# Patient Record
Sex: MALE | Race: WHITE | NOT HISPANIC OR LATINO | URBAN - METROPOLITAN AREA
[De-identification: names, ages, dates, MRNs, and addresses within clinical notes are randomized per-mention and may not be internally consistent; named-entity substitution may affect disease eponyms.]

---

## 2017-11-20 ENCOUNTER — APPOINTMENT (RX ONLY)
Dept: URBAN - METROPOLITAN AREA OTHER 10 | Facility: OTHER | Age: 72
Setting detail: DERMATOLOGY
End: 2017-11-20

## 2017-11-20 DIAGNOSIS — L95.8 OTHER VASCULITIS LIMITED TO THE SKIN: ICD-10-CM

## 2017-11-20 PROBLEM — I10 ESSENTIAL (PRIMARY) HYPERTENSION: Status: ACTIVE | Noted: 2017-11-20

## 2017-11-20 PROBLEM — L30.9 DERMATITIS, UNSPECIFIED: Status: ACTIVE | Noted: 2017-11-20

## 2017-11-20 PROBLEM — F31.9 BIPOLAR DISORDER, UNSPECIFIED: Status: ACTIVE | Noted: 2017-11-20

## 2017-11-20 PROCEDURE — 11100: CPT

## 2017-11-20 PROCEDURE — ? BIOPSY BY PUNCH METHOD

## 2017-11-20 ASSESSMENT — LOCATION ZONE DERM: LOCATION ZONE: ARM

## 2017-11-20 ASSESSMENT — LOCATION DETAILED DESCRIPTION DERM: LOCATION DETAILED: LEFT VENTRAL MEDIAL DISTAL FOREARM

## 2017-11-20 ASSESSMENT — LOCATION SIMPLE DESCRIPTION DERM: LOCATION SIMPLE: LEFT FOREARM

## 2017-11-20 NOTE — PROCEDURE: BIOPSY BY PUNCH METHOD
X Depth Of Punch In Cm (Optional): 0
Biopsy Type: H and E
Lab Facility: [id number]
Punch Size In Mm: 4
Notification Instructions: Patient will be notified of biopsy results. However, patient instructed to call the office if not contacted within 2 weeks.
Body Location Override (Optional - Billing Will Still Be Based On Selected Body Map Location If Applicable): left forearm
Anesthesia Type: 1% lidocaine with 1:200,000 epinephrine
Patient Will Remove Sutures At Home?: No
Home Suture Removal Text: Patient was provided a home suture removal kit and will remove their sutures at home.  If they have any questions or difficulties they will call the office.
Wound Care: Polysporin ointment
Detail Level: Detailed
Post-Care Instructions: I reviewed with the patient in detail post-care instructions. Patient is to keep the biopsy site dry overnight, and then apply bacitracin twice daily until healed. Patient may apply hydrogen peroxide soaks to remove any crusting.
Suture Removal: 14 days
Lab: [id number]
Anesthesia Volume In Cc: 1
Epidermal Sutures: 5-0 Nylon
Dressing: no dressing applied
Billing Type: Third-Party Bill
Consent: Written consent was obtained and risks were reviewed including but not limited to scarring, infection, bleeding, scabbing, incomplete removal, nerve damage and allergy to anesthesia.
Hemostasis: Pressure

## 2017-12-04 ENCOUNTER — APPOINTMENT (RX ONLY)
Dept: URBAN - METROPOLITAN AREA OTHER 10 | Facility: OTHER | Age: 72
Setting detail: DERMATOLOGY
End: 2017-12-04

## 2017-12-04 DIAGNOSIS — Z48.02 ENCOUNTER FOR REMOVAL OF SUTURES: ICD-10-CM

## 2017-12-04 DIAGNOSIS — M31.0 HYPERSENSITIVITY ANGIITIS: ICD-10-CM | Status: WORSENING

## 2017-12-04 PROCEDURE — 99024 POSTOP FOLLOW-UP VISIT: CPT

## 2017-12-04 PROCEDURE — ? PRESCRIPTION

## 2017-12-04 PROCEDURE — ? SUTURE REMOVAL (GLOBAL PERIOD)

## 2017-12-04 PROCEDURE — ? OTHER

## 2017-12-04 RX ORDER — PREDNISONE 10 MG/1
TABLET ORAL DAILY
Qty: 40 | Refills: 0 | Status: ERX | COMMUNITY
Start: 2017-12-04

## 2017-12-04 RX ADMIN — PREDNISONE: 10 TABLET ORAL at 14:30

## 2017-12-04 ASSESSMENT — LOCATION DETAILED DESCRIPTION DERM
LOCATION DETAILED: LEFT DISTAL PRETIBIAL REGION
LOCATION DETAILED: RIGHT DISTAL PRETIBIAL REGION
LOCATION DETAILED: LEFT ANTERIOR PROXIMAL THIGH
LOCATION DETAILED: LEFT MEDIAL INFERIOR CHEST
LOCATION DETAILED: LEFT PROXIMAL DORSAL FOREARM
LOCATION DETAILED: LEFT MID-UPPER BACK

## 2017-12-04 ASSESSMENT — BSA RASH: BSA RASH: 36

## 2017-12-04 ASSESSMENT — SEVERITY ASSESSMENT: SEVERITY: MODERATE

## 2017-12-04 ASSESSMENT — LOCATION SIMPLE DESCRIPTION DERM
LOCATION SIMPLE: LEFT THIGH
LOCATION SIMPLE: RIGHT PRETIBIAL REGION
LOCATION SIMPLE: CHEST
LOCATION SIMPLE: LEFT UPPER BACK
LOCATION SIMPLE: LEFT PRETIBIAL REGION
LOCATION SIMPLE: LEFT FOREARM

## 2017-12-04 ASSESSMENT — LOCATION ZONE DERM
LOCATION ZONE: LEG
LOCATION ZONE: ARM
LOCATION ZONE: TRUNK

## 2017-12-04 ASSESSMENT — PAIN INTENSITY VAS: HOW INTENSE IS YOUR PAIN 0 BEING NO PAIN, 10 BEING THE MOST SEVERE PAIN POSSIBLE?: NO PAIN

## 2017-12-04 NOTE — PROCEDURE: OTHER
Other (Free Text): ANCA panel, CBC, CMP, UA w/ Refelx, G6PD, DOROTEO w/ Reflex, RF, Sed Rate.\\n\\nWill obtain labs. Pt is worsening so a prednisone taper is warranted.
Detail Level: Zone
Note Text (......Xxx Chief Complaint.): This diagnosis correlates with the

## 2017-12-04 NOTE — PROCEDURE: SUTURE REMOVAL (GLOBAL PERIOD)
Add 83528 Cpt? (Important Note: In 2017 The Use Of 25284 Is Being Tracked By Cms To Determine Future Global Period Reimbursement For Global Periods): yes
Detail Level: Detailed
Body Location Override (Optional - Billing Will Still Be Based On Selected Body Map Location If Applicable): left posterior forearm

## 2017-12-08 ENCOUNTER — RX ONLY (OUTPATIENT)
Age: 72
Setting detail: RX ONLY
End: 2017-12-08

## 2017-12-08 RX ORDER — NITROFURANTOIN MACROCRYSTALS 100 MG/1
CAPSULE ORAL
Qty: 10 | Refills: 0 | Status: ERX | COMMUNITY
Start: 2017-12-08

## 2017-12-11 ENCOUNTER — RX ONLY (OUTPATIENT)
Age: 72
Setting detail: RX ONLY
End: 2017-12-11

## 2017-12-11 RX ORDER — DOXYCYCLINE HYCLATE 100 MG/1
CAPSULE, GELATIN COATED ORAL
Qty: 20 | Refills: 0 | Status: ERX | COMMUNITY
Start: 2017-12-11

## 2018-01-04 ENCOUNTER — APPOINTMENT (RX ONLY)
Dept: URBAN - METROPOLITAN AREA OTHER 10 | Facility: OTHER | Age: 73
Setting detail: DERMATOLOGY
End: 2018-01-04

## 2018-01-04 DIAGNOSIS — M31.0 HYPERSENSITIVITY ANGIITIS: ICD-10-CM | Status: IMPROVED

## 2018-01-04 DIAGNOSIS — B35.3 TINEA PEDIS: ICD-10-CM

## 2018-01-04 PROCEDURE — ? PATIENT SPECIFIC COUNSELING

## 2018-01-04 PROCEDURE — ? ORDER TESTS

## 2018-01-04 PROCEDURE — 99213 OFFICE O/P EST LOW 20 MIN: CPT

## 2018-01-04 PROCEDURE — ? PRESCRIPTION

## 2018-01-04 PROCEDURE — ? COUNSELING

## 2018-01-04 PROCEDURE — ? TREATMENT REGIMEN

## 2018-01-04 RX ORDER — KETOCONAZOLE 20 MG/G
1 CREAM TOPICAL QD
Qty: 60 | Refills: 3 | Status: ERX | COMMUNITY
Start: 2018-01-04

## 2018-01-04 RX ADMIN — KETOCONAZOLE 1: 20 CREAM TOPICAL at 15:23

## 2018-01-04 ASSESSMENT — SEVERITY ASSESSMENT: SEVERITY: CLEAR

## 2018-01-04 ASSESSMENT — LOCATION ZONE DERM
LOCATION ZONE: FEET
LOCATION ZONE: TRUNK
LOCATION ZONE: LEG

## 2018-01-04 ASSESSMENT — LOCATION SIMPLE DESCRIPTION DERM
LOCATION SIMPLE: LEFT PRETIBIAL REGION
LOCATION SIMPLE: RIGHT FOOT
LOCATION SIMPLE: CHEST
LOCATION SIMPLE: LEFT UPPER BACK

## 2018-01-04 ASSESSMENT — LOCATION DETAILED DESCRIPTION DERM
LOCATION DETAILED: 1ST WEBSPACE RIGHT FOOT
LOCATION DETAILED: LEFT MEDIAL UPPER BACK
LOCATION DETAILED: LEFT PROXIMAL PRETIBIAL REGION
LOCATION DETAILED: STERNUM

## 2018-01-04 ASSESSMENT — PAIN INTENSITY VAS: HOW INTENSE IS YOUR PAIN 0 BEING NO PAIN, 10 BEING THE MOST SEVERE PAIN POSSIBLE?: NO PAIN

## 2018-01-04 NOTE — PROCEDURE: PATIENT SPECIFIC COUNSELING
Detail Level: Simple
UTI is clear today, no need for Dapsone at this time. Will recheck UA with reflex

## 2018-01-04 NOTE — HPI: RASH (VASCULITIS)
How Severe Is It?: moderate
Is This A New Presentation, Or A Follow-Up?: Follow Up Vasculitis
Additional History: Patient states that he is doing much better

## 2018-01-04 NOTE — PROCEDURE: TREATMENT REGIMEN
Otc Regimen: Ok to soak in epson salt and alcohol
Detail Level: Simple
Initiate Treatment: Ketoconazole cream qd x 2 weeks apply between toes

## 2019-10-12 ENCOUNTER — HOSPITAL ENCOUNTER (INPATIENT)
Dept: HOSPITAL 5 - 3A | Age: 74
LOS: 10 days | Discharge: NURSING, REHAB OR LONG-TERM CARE | DRG: 885 | End: 2019-10-22
Attending: PSYCHIATRY & NEUROLOGY | Admitting: PSYCHIATRY & NEUROLOGY
Payer: MEDICARE

## 2019-10-12 DIAGNOSIS — I48.91: ICD-10-CM

## 2019-10-12 DIAGNOSIS — E78.5: ICD-10-CM

## 2019-10-12 DIAGNOSIS — Z88.8: ICD-10-CM

## 2019-10-12 DIAGNOSIS — Z88.6: ICD-10-CM

## 2019-10-12 DIAGNOSIS — G30.8: ICD-10-CM

## 2019-10-12 DIAGNOSIS — F02.81: ICD-10-CM

## 2019-10-12 DIAGNOSIS — F25.0: Primary | ICD-10-CM

## 2019-10-12 PROCEDURE — 93005 ELECTROCARDIOGRAM TRACING: CPT

## 2019-10-12 PROCEDURE — 83036 HEMOGLOBIN GLYCOSYLATED A1C: CPT

## 2019-10-12 PROCEDURE — 86592 SYPHILIS TEST NON-TREP QUAL: CPT

## 2019-10-12 PROCEDURE — 80164 ASSAY DIPROPYLACETIC ACD TOT: CPT

## 2019-10-12 PROCEDURE — 71045 X-RAY EXAM CHEST 1 VIEW: CPT

## 2019-10-12 PROCEDURE — 93010 ELECTROCARDIOGRAM REPORT: CPT

## 2019-10-12 PROCEDURE — 80053 COMPREHEN METABOLIC PANEL: CPT

## 2019-10-12 PROCEDURE — 84443 ASSAY THYROID STIM HORMONE: CPT

## 2019-10-12 PROCEDURE — 80061 LIPID PANEL: CPT

## 2019-10-12 PROCEDURE — 36415 COLL VENOUS BLD VENIPUNCTURE: CPT

## 2019-10-12 PROCEDURE — 85025 COMPLETE CBC W/AUTO DIFF WBC: CPT

## 2019-10-12 PROCEDURE — 82962 GLUCOSE BLOOD TEST: CPT

## 2019-10-13 LAB
ALBUMIN SERPL-MCNC: 4.2 G/DL (ref 3.9–5)
ALT SERPL-CCNC: 20 UNITS/L (ref 7–56)
BASOPHILS # (AUTO): 0 K/MM3 (ref 0–0.1)
BASOPHILS NFR BLD AUTO: 0.7 % (ref 0–1.8)
BUN SERPL-MCNC: 35 MG/DL (ref 9–20)
BUN/CREAT SERPL: 32 %
CALCIUM SERPL-MCNC: 9.5 MG/DL (ref 8.4–10.2)
EOSINOPHIL # BLD AUTO: 0 K/MM3 (ref 0–0.4)
EOSINOPHIL NFR BLD AUTO: 0.1 % (ref 0–4.3)
HCT VFR BLD CALC: 44.2 % (ref 35.5–45.6)
HDLC SERPL-MCNC: 43 MG/DL (ref 40–59)
HEMOLYSIS INDEX: 7
HGB BLD-MCNC: 14.8 GM/DL (ref 11.8–15.2)
LYMPHOCYTES # BLD AUTO: 1.5 K/MM3 (ref 1.2–5.4)
LYMPHOCYTES NFR BLD AUTO: 23.4 % (ref 13.4–35)
MCHC RBC AUTO-ENTMCNC: 33 % (ref 32–34)
MCV RBC AUTO: 90 FL (ref 84–94)
MONOCYTES # (AUTO): 0.6 K/MM3 (ref 0–0.8)
MONOCYTES % (AUTO): 10.1 % (ref 0–7.3)
PLATELET # BLD: 158 K/MM3 (ref 140–440)
RBC # BLD AUTO: 4.94 M/MM3 (ref 3.65–5.03)

## 2019-10-13 RX ADMIN — GUAIFENESIN SCH MG: 600 TABLET ORAL at 11:22

## 2019-10-13 RX ADMIN — VENLAFAXINE HYDROCHLORIDE SCH: 75 CAPSULE, EXTENDED RELEASE ORAL at 19:51

## 2019-10-13 RX ADMIN — DIPHENHYDRAMINE HYDROCHLORIDE PRN MG: 50 INJECTION, SOLUTION INTRAMUSCULAR; INTRAVENOUS at 11:26

## 2019-10-13 RX ADMIN — MIRTAZAPINE SCH MG: 30 TABLET, FILM COATED ORAL at 21:23

## 2019-10-13 RX ADMIN — NAPROXEN SCH MG: 375 TABLET ORAL at 11:21

## 2019-10-13 RX ADMIN — GUAIFENESIN SCH MG: 600 TABLET ORAL at 21:19

## 2019-10-13 RX ADMIN — METOPROLOL SUCCINATE SCH MG: 50 TABLET, EXTENDED RELEASE ORAL at 11:20

## 2019-10-13 RX ADMIN — Medication SCH MG: at 21:18

## 2019-10-13 RX ADMIN — MODAFINIL SCH MG: 100 TABLET ORAL at 11:22

## 2019-10-13 RX ADMIN — NAPROXEN SCH MG: 375 TABLET ORAL at 21:18

## 2019-10-13 RX ADMIN — DILTIAZEM HYDROCHLORIDE SCH MG: 120 CAPSULE, COATED, EXTENDED RELEASE ORAL at 11:22

## 2019-10-13 RX ADMIN — ASPIRIN SCH MG: 81 TABLET, COATED ORAL at 11:21

## 2019-10-14 RX ADMIN — NAPROXEN SCH MG: 375 TABLET ORAL at 21:39

## 2019-10-14 RX ADMIN — GUAIFENESIN SCH MG: 600 TABLET ORAL at 21:39

## 2019-10-14 RX ADMIN — DILTIAZEM HYDROCHLORIDE SCH: 120 CAPSULE, COATED, EXTENDED RELEASE ORAL at 09:32

## 2019-10-14 RX ADMIN — NAPROXEN SCH MG: 375 TABLET ORAL at 09:26

## 2019-10-14 RX ADMIN — GUAIFENESIN SCH MG: 600 TABLET ORAL at 09:26

## 2019-10-14 RX ADMIN — METOPROLOL SUCCINATE SCH: 50 TABLET, EXTENDED RELEASE ORAL at 09:32

## 2019-10-14 RX ADMIN — MIRTAZAPINE SCH MG: 30 TABLET, FILM COATED ORAL at 21:40

## 2019-10-14 RX ADMIN — VENLAFAXINE HYDROCHLORIDE SCH MG: 75 CAPSULE, EXTENDED RELEASE ORAL at 09:27

## 2019-10-14 RX ADMIN — ZIPRASIDONE MESYLATE PRN MG: 20 INJECTION, POWDER, LYOPHILIZED, FOR SOLUTION INTRAMUSCULAR at 14:56

## 2019-10-14 RX ADMIN — ZIPRASIDONE MESYLATE PRN MG: 20 INJECTION, POWDER, LYOPHILIZED, FOR SOLUTION INTRAMUSCULAR at 00:44

## 2019-10-14 RX ADMIN — ASPIRIN SCH MG: 81 TABLET, COATED ORAL at 09:26

## 2019-10-14 RX ADMIN — MODAFINIL SCH MG: 100 TABLET ORAL at 10:01

## 2019-10-14 RX ADMIN — Medication SCH MG: at 21:40

## 2019-10-14 NOTE — CONSULTATION
History of Present Illness





- Reason for Consult


Consult date: 10/14/19


Atrial fibrillation, hypertension


Requesting physician: NIKI SAVAGE





- History of Present Illness


patient admitted to Psych floor and hospitalist consulted for medical 

management. He is confused, cannot give history.








Past History


Past Medical History: hyperlipidemia, other (afib)


Past Surgical History: Other (Unknown)





Medications and Allergies


                                    Allergies











Allergy/AdvReac Type Severity Reaction Status Date / Time


 


aripiprazole [From Abilify] Allergy  Unknown Verified 10/12/19 23:01


 


diazepam [From Valium] Allergy  Unknown Verified 10/12/19 23:01


 


lithium Allergy  Unknown Verified 10/12/19 23:01


 


lorazepam [From Ativan] Allergy  Unknown Verified 10/12/19 23:01











                                Home Medications











 Medication  Instructions  Recorded  Confirmed  Last Taken  Type


 


AtorvaSTATin [Lipitor] 40 mg PO QHS 10/13/19 10/14/19 Unknown History


 


Divalproex Sprinkle [Depakote 500 mg PO Q8H 10/13/19 10/14/19 Unknown History





Sprinkle]     


 


Metoprolol Xl [Metoprolol 50 mg PO DAILY 10/13/19 10/14/19 Unknown History





SUCCINATE ER TAB]     


 


Mirtazapine [Remeron 30mg TAB] 30 mg PO QHS 10/13/19 10/14/19 Unknown History


 


Modafinil [Provigil] 100 mg PO DAILY 10/13/19 10/14/19 Unknown History


 


Naproxen [Naproxen DR] 375 mg PO BID 10/13/19 10/14/19 Unknown History


 


OLANzapine [ZyPREXA] 5 mg PO TID 10/13/19 10/14/19 Unknown History


 


Venlafaxine  mg PO DAILY 10/13/19 10/14/19 Unknown History


 


dilTIAZem CD [Cardizem Cd] 120 mg PO DAILY 10/13/19 10/13/19 Unknown History


 


OLANzapine ZYDIS [ZyPREXA Zydis] 10 mg PO TID PRN 10/14/19 10/14/19 Unknown 

History











Active Meds: 


Active Medications





Aspirin (Halfprin Ec)  81 mg PO QDAY [facility]


   Last Admin: 10/14/19 09:26 Dose:  81 mg


   Documented by: 


Atorvastatin Calcium (Lipitor)  40 mg PO QHS [facility]


   Last Admin: 10/13/19 21:18 Dose:  40 mg


   Documented by: 


Diltiazem HCl (Cardizem Cd)  120 mg PO QDAY [facility]


   Last Admin: 10/14/19 09:32 Dose:  Not Given


   Documented by: 


Diphenhydramine HCl (Benadryl)  50 mg IM Q6H PRN


   PRN Reason: Extrapyramidal Effects


   Last Admin: 10/13/19 11:26 Dose:  50 mg


   Documented by: 


Diphenhydramine HCl (Benadryl)  50 mg PO Q6H PRN


   PRN Reason: Extrapyramidal Effects


Divalproex Sodium (Depakote Sprinkle)  500 mg PO Q8HR [facility]


   Last Admin: 10/14/19 13:40 Dose:  500 mg


   Documented by: 


Guaifenesin (Mucinex Er)  600 mg PO BID [facility]


   Last Admin: 10/14/19 09:26 Dose:  600 mg


   Documented by: 


Melatonin (Melatonin)  3 mg PO QHS [facility]


   Last Admin: 10/13/19 21:18 Dose:  3 mg


   Documented by: 


Metoprolol Succinate (Toprol Xl)  50 mg PO QDAY [facility]


   Last Admin: 10/14/19 09:32 Dose:  Not Given


   Documented by: 


Mirtazapine (Remeron)  30 mg PO QHS [facility]


   Last Admin: 10/13/19 21:23 Dose:  30 mg


   Documented by: 


Modafinil (Provigil)  100 mg PO QAM [facility]


   Last Admin: 10/14/19 10:01 Dose:  100 mg


   Documented by: 


Naproxen (Naprosyn)  375 mg PO BID [facility]


   Last Admin: 10/14/19 09:26 Dose:  375 mg


   Documented by: 


Olanzapine (Zyprexa Zydis)  10 mg PO TID PRN


   PRN Reason: Agitation


   Last Admin: 10/13/19 17:52 Dose:  10 mg


   Documented by: 


Olanzapine (Zyprexa Zydis)  5 mg PO TID [facility]


   Last Admin: 10/14/19 13:41 Dose:  5 mg


   Documented by: 


Venlafaxine HCl (Effexor Xr)  150 mg PO QDAY [facility]


   Last Admin: 10/14/19 09:27 Dose:  150 mg


   Documented by: 











Exam





- Constitutional


Vitals: 


                                        











Temp Pulse Resp BP Pulse Ox


 


 97.9 F   60   18   90/60   100 


 


 10/13/19 21:54  10/14/19 09:32  10/13/19 21:54  10/14/19 09:32  10/13/19 10:00











General appearance: Present: no acute distress





- EENT


Eyes: Present: PERRL


ENT: hearing intact





- Neck


Neck: Present: supple





- Respiratory


Respiratory effort: normal


Respiratory: bilateral: CTA





- Cardiovascular


Rhythm: regular


Heart Sounds: Present: S1 & S2 (S1 and S2 )





- Extremities


Extremities: No edema


Extremity abnormal: other (small abrasions,ulcer on legs)





- Abdominal


General gastrointestinal: Present: soft, non-tender, non-distended, normal bowel

sounds





- Integumentary


Integumentary: Present: clear, warm, dry





- Musculoskeletal


Musculoskeletal: strength equal bilaterally





- Neurologic


Neurologic: moves all extremities, other (awake,alert,confused)





Results





- Labs


CBC & Chem 7: 


                                 10/13/19 09:24





                                 10/13/19 09:24





Assessment and Plan


Hyperlipidemia


cont statin





Afib


Obtain EKG for baseline

## 2019-10-15 RX ADMIN — Medication SCH MG: at 21:32

## 2019-10-15 RX ADMIN — MIRTAZAPINE SCH MG: 30 TABLET, FILM COATED ORAL at 21:31

## 2019-10-15 RX ADMIN — NAPROXEN SCH MG: 375 TABLET ORAL at 10:36

## 2019-10-15 RX ADMIN — MODAFINIL SCH MG: 100 TABLET ORAL at 10:35

## 2019-10-15 RX ADMIN — ASPIRIN SCH MG: 81 TABLET, COATED ORAL at 10:37

## 2019-10-15 RX ADMIN — HALOPERIDOL LACTATE PRN MG: 5 INJECTION, SOLUTION INTRAMUSCULAR at 14:55

## 2019-10-15 RX ADMIN — GUAIFENESIN SCH MG: 600 TABLET ORAL at 10:37

## 2019-10-15 RX ADMIN — GUAIFENESIN SCH MG: 600 TABLET ORAL at 21:31

## 2019-10-15 RX ADMIN — DILTIAZEM HYDROCHLORIDE SCH MG: 120 CAPSULE, COATED, EXTENDED RELEASE ORAL at 10:35

## 2019-10-15 RX ADMIN — VENLAFAXINE HYDROCHLORIDE SCH MG: 75 CAPSULE, EXTENDED RELEASE ORAL at 10:37

## 2019-10-15 RX ADMIN — DIPHENHYDRAMINE HYDROCHLORIDE PRN MG: 50 INJECTION, SOLUTION INTRAMUSCULAR; INTRAVENOUS at 14:55

## 2019-10-15 RX ADMIN — METOPROLOL SUCCINATE SCH MG: 50 TABLET, EXTENDED RELEASE ORAL at 10:37

## 2019-10-15 RX ADMIN — NAPROXEN SCH MG: 375 TABLET ORAL at 21:32

## 2019-10-15 NOTE — PROGRESS NOTE
Subjective


Date of service: 10/15/19


Principal diagnosis: Dementia, Schizoaffective disorder


Subjective Comment: 


The patient patient's mood is labile and he is very unpredictable, can be 

combative, impulsive, and irritable but calm and cooperative some times. He is 

irritable, threatening and uncooperative this morning. 


MSE


Orientation: person


Affect: agitated


Mood: congruent with affect


Thought Process: Disorganized


Perceptions: none


Speech: paucity


Concentration: unable to pay attention


Motor activity: agitated


Level of consciousness: alert


Memory: Recent Impaired, Remote Impaired


Interaction: hostile, uncooperative





Objective





- Criteria for Continued Treatment


Criteria for Continued Treatment: Improving Level of Functioning, Stablizing 

Level of Functioning, Improving Emotional/Socia





- Objective Observation


Participation Level: Minimal





Assessment and Plan





- Patient Problems


(1) Major neurocognitive disorder due to Alzheimer's disease, with behavioral 

disturbance


Current Visit: Yes   Status: Acute   





(2) Schizoaffective disorder, bipolar type


Current Visit: Yes   Status: Acute   


Plan to address problem: 


Patient will be admitted for inpatient psychiatric evaluation, medication 

adjustment and close monitoring


 The patient's behavior, mood, sleep and appetite will be closely monitored.


 Patient will be enrolled in individual and group therapeutic sessions and 

encouraged to attend.


 Patient will be provided with a safe and structured environment.


 Patient's physical health needs will be addressed by the Hospitalist.


 Social Assessment will be completed and the  will work with 

patient and family to ensure a suitable and safe disposition


 Medication adjustment will be made as clinically indicated


 The patient agreed on the treatment plan, understood the risk, benefit, 

alternative treatment, potential consequence of no treatment, and gave informed 

consent.








Medications and Allergies


                                    Allergies











Allergy/AdvReac Type Severity Reaction Status Date / Time


 


aripiprazole [From Abilify] Allergy  Unknown Verified 10/12/19 23:01


 


diazepam [From Valium] Allergy  Unknown Verified 10/12/19 23:01


 


lithium Allergy  Unknown Verified 10/12/19 23:01


 


lorazepam [From Ativan] Allergy  Unknown Verified 10/12/19 23:01











                                Home Medications











 Medication  Instructions  Recorded  Confirmed  Last Taken  Type


 


AtorvaSTATin [Lipitor] 40 mg PO QHS 10/13/19 10/14/19 Unknown History


 


Divalproex Sprinkle [Depakote 500 mg PO Q8H 10/13/19 10/14/19 Unknown History





Sprinkle]     


 


Metoprolol Xl [Metoprolol 50 mg PO DAILY 10/13/19 10/14/19 Unknown History





SUCCINATE ER TAB]     


 


Mirtazapine [Remeron 30mg TAB] 30 mg PO QHS 10/13/19 10/14/19 Unknown History


 


Modafinil [Provigil] 100 mg PO DAILY 10/13/19 10/14/19 Unknown History


 


Naproxen [Naproxen DR] 375 mg PO BID 10/13/19 10/14/19 Unknown History


 


OLANzapine [ZyPREXA] 5 mg PO TID 10/13/19 10/14/19 Unknown History


 


Venlafaxine  mg PO DAILY 10/13/19 10/14/19 Unknown History


 


dilTIAZem CD [Cardizem Cd] 120 mg PO DAILY 10/13/19 10/13/19 Unknown History


 


OLANzapine ZYDIS [ZyPREXA Zydis] 10 mg PO TID PRN 10/14/19 10/14/19 Unknown 

History











Active Meds: 


Active Medications





Aspirin (Halfprin Ec)  [facility] PO [facility]DAY [facility]


   Last Admin: 10/15/19 10:37 Dose:  81 mg


   Documented by: 


Atorvastatin Calcium (Lipitor)  40 mg PO QHS [facility]


   Last Admin: 10/14/19 21:39 Dose:  40 mg


   Documented by: 


Diltiazem HCl (Cardizem Cd)  120 mg PO QDAY [facility]


   Last Admin: 10/15/19 10:35 Dose:  120 mg


   Documented by: 


Diphenhydramine HCl (Benadryl)  50 mg IM Q6H PRN


   PRN Reason: Extrapyramidal Effects


   Last Admin: 10/15/19 14:55 Dose:  50 mg


   Documented by: 


Diphenhydramine HCl (Benadryl)  50 mg PO Q6H PRN


   PRN Reason: Extrapyramidal Effects


Divalproex Sodium (Depakote Sprinkle)  500 mg PO Q8HR [facility]


   Last Admin: 10/15/19 14:23 Dose:  500 mg


   Documented by: 


Guaifenesin (Mucinex Er)  600 mg PO BID [facility]


   Last Admin: 10/15/19 10:37 Dose:  600 mg


   Documented by: 


Haloperidol (Haldol)  5 mg PO Q6H PRN


   PRN Reason: Agitation


Haloperidol Lactate (Haldol)  5 mg IM Q6H PRN


   PRN Reason: Agitation


   Last Admin: 10/15/19 14:55 Dose:  5 mg


   Documented by: 


Melatonin (Melatonin)  3 mg PO QHS [facility]


   Last Admin: 10/14/19 21:40 Dose:  3 mg


   Documented by: 


Metoprolol Succinate (Toprol Xl)  50 mg PO QDAY [facility]


   Last Admin: 10/15/19 10:37 Dose:  50 mg


   Documented by: 


Mirtazapine (Remeron)  30 mg PO QHS [facility]


   Last Admin: 10/14/19 21:40 Dose:  30 mg


   Documented by: 


Modafinil (Provigil)  100 mg PO QAM [facility]


   Last Admin: 10/15/19 10:35 Dose:  100 mg


   Documented by: 


Naproxen (Naprosyn)  375 mg PO BID [facility]


   Last Admin: 10/15/19 10:36 Dose:  375 mg


   Documented by: 


Olanzapine (Zyprexa Zydis)  10 mg PO TID PRN


   PRN Reason: Agitation


   Last Admin: 10/13/19 17:52 Dose:  10 mg


   Documented by: 


Olanzapine (Zyprexa Zydis)  5 mg PO TID [facility]


   Last Admin: 10/15/19 14:24 Dose:  5 mg


   Documented by: 


Venlafaxine HCl (Effexor Xr)  150 mg PO QDAY [facility]


   Last Admin: 10/15/19 10:37 Dose:  150 mg


   Documented by: 











Results





- Results


Labs/Vitals: 


                             Laboratory Last Values











WBC  6.2 K/mm3 (4.5-11.0)   10/13/19  09:24    


 


RBC  4.94 M/mm3 (3.65-5.03)   10/13/19  09:24    


 


Hgb  14.8 gm/dl (11.8-15.2)   10/13/19  09:24    


 


Hct  44.2 % (35.5-45.6)   10/13/19  09:24    


 


MCV  90 fl (84-94)   10/13/19  09:24    


 


MCH  30 pg (28-32)   10/13/19  09:24    


 


MCHC  33 % (32-34)   10/13/19  09:24    


 


RDW  15.8 % (13.2-15.2)  H  10/13/19  09:24    


 


Plt Count  158 K/mm3 (140-440)   10/13/19  09:24    


 


Lymph % (Auto)  23.4 % (13.4-35.0)   10/13/19  09:24    


 


Mono % (Auto)  10.1 % (0.0-7.3)  H  10/13/19  09:24    


 


Eos % (Auto)  0.1 % (0.0-4.3)   10/13/19  09:24    


 


Baso % (Auto)  0.7 % (0.0-1.8)   10/13/19  09:24    


 


Lymph #  1.5 K/mm3 (1.2-5.4)   10/13/19  09:24    


 


Mono #  0.6 K/mm3 (0.0-0.8)   10/13/19  09:24    


 


Eos #  0.0 K/mm3 (0.0-0.4)   10/13/19  09:24    


 


Baso #  0.0 K/mm3 (0.0-0.1)   10/13/19  09:24    


 


Seg Neutrophils %  65.7 % (40.0-70.0)   10/13/19  09:24    


 


Seg Neutrophils #  4.1 K/mm3 (1.8-7.7)   10/13/19  09:24    


 


Sodium  145 mmol/L (137-145)   10/13/19  09:24    


 


Potassium  4.3 mmol/L (3.6-5.0)   10/13/19  09:24    


 


Chloride  104.1 mmol/L ()   10/13/19  09:24    


 


Carbon Dioxide  25 mmol/L (22-30)   10/13/19  09:24    


 


Anion Gap  20 mmol/L  10/13/19  09:24    


 


BUN  35 mg/dL (9-20)  H  10/13/19  09:24    


 


Creatinine  1.1 mg/dL (0.8-1.5)   10/13/19  09:24    


 


Estimated GFR  > 60 ml/min  10/13/19  09:24    


 


BUN/Creatinine Ratio  32 %  10/13/19  09:24    


 


Glucose  98 mg/dL ()   10/13/19  09:24    


 


POC Glucose  106  ()  H  10/13/19  16:39    


 


Hemoglobin A1c  5.5 % (4-6)   10/13/19  09:24    


 


Calcium  9.5 mg/dL (8.4-10.2)   10/13/19  09:24    


 


Total Bilirubin  0.40 mg/dL (0.1-1.2)   10/13/19  09:24    


 


AST  29 units/L (5-40)   10/13/19  09:24    


 


ALT  20 units/L (7-56)   10/13/19  09:24    


 


Alkaline Phosphatase  83 units/L ()   10/13/19  09:24    


 


Total Protein  6.5 g/dL (6.3-8.2)   10/13/19  09:24    


 


Albumin  4.2 g/dL (3.9-5)   10/13/19  09:24    


 


Albumin/Globulin Ratio  1.8 %  10/13/19  09:24    


 


Triglycerides  129 mg/dL (2-149)   10/13/19  09:24    


 


Cholesterol  133 mg/dL ()   10/13/19  09:24    


 


LDL Cholesterol Direct  74 mg/dL ()   10/13/19  09:24    


 


HDL Cholesterol  43 mg/dL (40-59)   10/13/19  09:24    


 


Cholesterol/HDL Ratio  3.09 %  10/13/19  09:24    


 


TSH  3.070 mlU/mL (0.270-4.200)   10/13/19  09:24    


 


Valproic Acid  31.8 ug/mL ()  L  10/13/19  09:24    


 


RPR  Nonreactive  (Nonreactive)   10/13/19  09:24    








                                Last Vital Signs











Temp  98.3 F   10/14/19 22:00


 


Pulse  89   10/15/19 10:37


 


Resp  20   10/14/19 22:00


 


BP  173/94   10/15/19 10:37


 


Pulse Ox  98   10/14/19 22:00

## 2019-10-15 NOTE — HISTORY AND PHYSICAL REPORT
GP History & Physical





- History of Present Illness


Date of admission: 10/13/19


Date of Examination: 10/14/19


Reason for Admission: Danger to others, Unable to care for self


Chief Complaint: Aggression 


History of Present Illness: 





Patient is a 73yo  male with reported history of psychosis, 

schizoaffective disorder and aggressive behavior. He was presents to the 

[facility] Unit with c/o aggressive behavior, not cooperative with cares 

and disruptive behaviors. He is confused, agitated and unable to give history. 


Legal Status: Involuntary


Patient Problems: 


Current Active Problems





Major neurocognitive disorder due to Alzheimer's disease, with behavioral 

disturbance (Acute)


Schizoaffective disorder, bipolar type (Acute)








Reaction to Hospitalization: Resistant





Medications and Allergies


                                    Allergies











Allergy/AdvReac Type Severity Reaction Status Date / Time


 


aripiprazole [From Abilify] Allergy  Unknown Verified 10/12/19 23:01


 


diazepam [From Valium] Allergy  Unknown Verified 10/12/19 23:01


 


lithium Allergy  Unknown Verified 10/12/19 23:01


 


lorazepam [From Ativan] Allergy  Unknown Verified 10/12/19 23:01











                                Home Medications











 Medication  Instructions  Recorded  Confirmed  Last Taken  Type


 


AtorvaSTATin [Lipitor] 40 mg PO QHS 10/13/19 10/14/19 Unknown History


 


Divalproex Sprinkle [Depakote 500 mg PO Q8H 10/13/19 10/14/19 Unknown History





Sprinkle]     


 


Metoprolol Xl [Metoprolol 50 mg PO DAILY 10/13/19 10/14/19 Unknown History





SUCCINATE ER TAB]     


 


Mirtazapine [Remeron 30mg TAB] 30 mg PO QHS 10/13/19 10/14/19 Unknown History


 


Modafinil [Provigil] 100 mg PO DAILY 10/13/19 10/14/19 Unknown History


 


Naproxen [Naproxen DR] 375 mg PO BID 10/13/19 10/14/19 Unknown History


 


OLANzapine [ZyPREXA] 5 mg PO TID 10/13/19 10/14/19 Unknown History


 


Venlafaxine  mg PO DAILY 10/13/19 10/14/19 Unknown History


 


dilTIAZem CD [Cardizem Cd] 120 mg PO DAILY 10/13/19 10/13/19 Unknown History


 


OLANzapine ZYDIS [ZyPREXA Zydis] 10 mg PO TID PRN 10/14/19 10/14/19 Unknown 

History











Active Meds: 


Active Medications





Aspirin (Halfprin Ec)  81 mg PO QDAY [facility]


   Last Admin: 10/15/19 10:37 Dose:  81 mg


   Documented by: 


Atorvastatin Calcium (Lipitor)  40 mg PO QHS [facility]


   Last Admin: 10/14/19 21:39 Dose:  40 mg


   Documented by: 


Diltiazem HCl (Cardizem Cd)  120 mg PO QDAY [facility]


   Last Admin: 10/15/19 10:35 Dose:  120 mg


   Documented by: 


Diphenhydramine HCl (Benadryl)  50 mg IM Q6H PRN


   PRN Reason: Extrapyramidal Effects


   Last Admin: 10/15/19 14:55 Dose:  50 mg


   Documented by: 


Diphenhydramine HCl (Benadryl)  50 mg PO Q6H PRN


   PRN Reason: Extrapyramidal Effects


Divalproex Sodium (Depakote Sprinkle)  500 mg PO Q8HR [facility]


   Last Admin: 10/15/19 14:23 Dose:  500 mg


   Documented by: 


Guaifenesin (Mucinex Er)  600 mg PO BID [facility]


   Last Admin: 10/15/19 10:37 Dose:  600 mg


   Documented by: 


Haloperidol (Haldol)  5 mg PO Q6H PRN


   PRN Reason: Agitation


Haloperidol Lactate (Haldol)  5 mg IM Q6H PRN


   PRN Reason: Agitation


   Last Admin: 10/15/19 14:55 Dose:  5 mg


   Documented by: 


Melatonin (Melatonin)  3 mg PO QHS [facility]


   Last Admin: 10/14/19 21:40 Dose:  3 mg


   Documented by: 


Metoprolol Succinate (Toprol Xl)  50 mg PO QDAY [facility]


   Last Admin: 10/15/19 10:37 Dose:  50 mg


   Documented by: 


Mirtazapine (Remeron)  30 mg PO QHS [facility]


   Last Admin: 10/14/19 21:40 Dose:  30 mg


   Documented by: 


Modafinil (Provigil)  100 mg PO QAM [facility]


   Last Admin: 10/15/19 10:35 Dose:  100 mg


   Documented by: 


Naproxen (Naprosyn)  375 mg PO BID [facility]


   Last Admin: 10/15/19 10:36 Dose:  375 mg


   Documented by: 


Olanzapine (Zyprexa Zydis)  10 mg PO TID PRN


   PRN Reason: Agitation


   Last Admin: 10/13/19 17:52 Dose:  10 mg


   Documented by: 


Olanzapine (Zyprexa Zydis)  5 mg PO TID [facility]


   Last Admin: 10/15/19 14:24 Dose:  5 mg


   Documented by: 


Venlafaxine HCl (Effexor Xr)  150 mg PO QDAY [facility]


   Last Admin: 10/15/19 10:37 Dose:  150 mg


   Documented by: 











Substance History





- Substance History


Drug Use: none


Hx Tobacco Use: No


Alcohol Use: No





Past psychiatric history





- Past Medical History


Past Medical History: hyperlipidemia





- past Psychiatric treatment and history


Psych: Psychosis





- Social History


Social history: other (Unable to obtain Social history)





Review of Systems


ROS unobtainable: due to mental status





Results





- Results


Labs/Vitals: 


                             Laboratory Last Values











WBC  6.2 K/mm3 (4.5-11.0)   10/13/19  09:24    


 


RBC  4.94 M/mm3 (3.65-5.03)   10/13/19  09:24    


 


Hgb  14.8 gm/dl (11.8-15.2)   10/13/19  09:24    


 


Hct  44.2 % (35.5-45.6)   10/13/19  09:24    


 


MCV  90 fl (84-94)   10/13/19  09:24    


 


MCH  30 pg (28-32)   10/13/19  09:24    


 


MCHC  33 % (32-34)   10/13/19  09:24    


 


RDW  15.8 % (13.2-15.2)  H  10/13/19  09:24    


 


Plt Count  158 K/mm3 (140-440)   10/13/19  09:24    


 


Lymph % (Auto)  23.4 % (13.4-35.0)   10/13/19  09:24    


 


Mono % (Auto)  10.1 % (0.0-7.3)  H  10/13/19  09:24    


 


Eos % (Auto)  0.1 % (0.0-4.3)   10/13/19  09:24    


 


Baso % (Auto)  0.7 % (0.0-1.8)   10/13/19  09:24    


 


Lymph #  1.5 K/mm3 (1.2-5.4)   10/13/19  09:24    


 


Mono #  0.6 K/mm3 (0.0-0.8)   10/13/19  09:24    


 


Eos #  0.0 K/mm3 (0.0-0.4)   10/13/19  09:24    


 


Baso #  0.0 K/mm3 (0.0-0.1)   10/13/19  09:24    


 


Seg Neutrophils %  65.7 % (40.0-70.0)   10/13/19  09:24    


 


Seg Neutrophils #  4.1 K/mm3 (1.8-7.7)   10/13/19  09:24    


 


Sodium  145 mmol/L (137-145)   10/13/19  09:24    


 


Potassium  4.3 mmol/L (3.6-5.0)   10/13/19  09:24    


 


Chloride  104.1 mmol/L ()   10/13/19  09:24    


 


Carbon Dioxide  25 mmol/L (22-30)   10/13/19  09:24    


 


Anion Gap  20 mmol/L  10/13/19  09:24    


 


BUN  35 mg/dL (9-20)  H  10/13/19  09:24    


 


Creatinine  1.1 mg/dL (0.8-1.5)   10/13/19  09:24    


 


Estimated GFR  > 60 ml/min  10/13/19  09:24    


 


BUN/Creatinine Ratio  32 %  10/13/19  09:24    


 


Glucose  98 mg/dL ()   10/13/19  09:24    


 


POC Glucose  106  ()  H  10/13/19  16:39    


 


Hemoglobin A1c  5.5 % (4-6)   10/13/19  09:24    


 


Calcium  9.5 mg/dL (8.4-10.2)   10/13/19  09:24    


 


Total Bilirubin  0.40 mg/dL (0.1-1.2)   10/13/19  09:24    


 


AST  29 units/L (5-40)   10/13/19  09:24    


 


ALT  20 units/L (7-56)   10/13/19  09:24    


 


Alkaline Phosphatase  83 units/L ()   10/13/19  09:24    


 


Total Protein  6.5 g/dL (6.3-8.2)   10/13/19  09:24    


 


Albumin  4.2 g/dL (3.9-5)   10/13/19  09:24    


 


Albumin/Globulin Ratio  1.8 %  10/13/19  09:24    


 


Triglycerides  129 mg/dL (2-149)   10/13/19  09:24    


 


Cholesterol  133 mg/dL ()   10/13/19  09:24    


 


LDL Cholesterol Direct  74 mg/dL ()   10/13/19  09:24    


 


HDL Cholesterol  43 mg/dL (40-59)   10/13/19  09:24    


 


Cholesterol/HDL Ratio  3.09 %  10/13/19  09:24    


 


TSH  3.070 mlU/mL (0.270-4.200)   10/13/19  09:24    


 


Valproic Acid  31.8 ug/mL ()  L  10/13/19  09:24    


 


RPR  Nonreactive  (Nonreactive)   10/13/19  09:24    








                                Last Vital Signs











Temp  98.3 F   10/14/19 22:00


 


Pulse  89   10/15/19 10:37


 


Resp  20   10/14/19 22:00


 


BP  173/94   10/15/19 10:37


 


Pulse Ox  98   10/14/19 22:00














Physical Examination





- Constitutional


Vitals: 


                                   Vital Signs











Temp Pulse Resp BP Pulse Ox


 


 98.3 F   89   20   173/94   98 


 


 10/14/19 22:00  10/15/19 10:37  10/14/19 22:00  10/15/19 10:37  10/14/19 22:00








                           Temperature -Last 24 Hours











Temperature                    98.3 F














General appearance: Present: no acute distress





- EENT


Eyes: Present: PERRL, EOM intact


ENT: hearing intact, clear oral mucosa





- Neck


Neck: Present: supple, normal ROM





- Respiratory


Respiratory effort: normal





Mental Status Exam





- Vital signs


                                Last Vital Signs











Temp  98.3 F   10/14/19 22:00


 


Pulse  89   10/15/19 10:37


 


Resp  20   10/14/19 22:00


 


BP  173/94   10/15/19 10:37


 


Pulse Ox  98   10/14/19 22:00














- Exam


Orientation: person


Affect: agitated


Mood: congruent with affect


Thought Process: Disorganized


Perceptions: none


Speech: paucity


Concentration: unable to pay attention


Motor activity: agitated


Level of consciousness: alert


Memory: Recent Impaired, Remote Impaired


Interaction: hostile, uncooperative





Assessment and Plan





- Psychiatric problem


(1) Major neurocognitive disorder due to Alzheimer's disease, with behavioral 

disturbance


Current Visit: Yes   Status: Acute   





(2) Schizoaffective disorder, bipolar type


Current Visit: Yes   Status: Acute   


plan to address problem: 


Patient will be admitted for inpatient psychiatric evaluation, medication 

adjustment and close monitoring


 The patient's behavior, mood, sleep and appetite will be closely monitored.


 Patient will be enrolled in individual and group therapeutic sessions and 

encouraged to attend.


 Patient will be provided with a safe and structured environment.


 Patient's physical health needs will be addressed by the Hospitalist.


 Social Assessment will be completed and the  will work with 

patient and family to ensure a suitable and safe disposition


 Medication adjustment will be made as clinically indicated


 The patient agreed on the treatment plan, understood the risk, benefit, 

alternative treatment, potential consequence of no treatment, and gave informed 

consent.








Physician Certification





- Certification Statement


Physician Certification Statement: 


This is an acknowledgement statement that DEMETRI RIOS is a 74 year old M who 

requires inpatient psychiatric admission for treatment which could reasonably be

expected to improve the patient's condition for behavioral disturbance





Estimated period of time patient will need to remain in the hospital: 7 days





Plan for post-hospital care: Out-patient care

## 2019-10-16 RX ADMIN — DIPHENHYDRAMINE HYDROCHLORIDE PRN MG: 50 INJECTION, SOLUTION INTRAMUSCULAR; INTRAVENOUS at 10:30

## 2019-10-16 RX ADMIN — ASPIRIN SCH MG: 81 TABLET, COATED ORAL at 09:24

## 2019-10-16 RX ADMIN — Medication SCH MG: at 21:55

## 2019-10-16 RX ADMIN — VENLAFAXINE HYDROCHLORIDE SCH MG: 75 CAPSULE, EXTENDED RELEASE ORAL at 09:24

## 2019-10-16 RX ADMIN — MIRTAZAPINE SCH MG: 30 TABLET, FILM COATED ORAL at 21:55

## 2019-10-16 RX ADMIN — GUAIFENESIN SCH MG: 600 TABLET ORAL at 09:24

## 2019-10-16 RX ADMIN — HALOPERIDOL LACTATE PRN MG: 5 INJECTION, SOLUTION INTRAMUSCULAR at 16:01

## 2019-10-16 RX ADMIN — GUAIFENESIN SCH MG: 600 TABLET ORAL at 21:55

## 2019-10-16 RX ADMIN — MODAFINIL SCH MG: 100 TABLET ORAL at 09:24

## 2019-10-16 RX ADMIN — HALOPERIDOL PRN MG: 5 TABLET ORAL at 09:25

## 2019-10-16 RX ADMIN — METOPROLOL SUCCINATE SCH MG: 50 TABLET, EXTENDED RELEASE ORAL at 09:29

## 2019-10-16 RX ADMIN — NAPROXEN SCH MG: 375 TABLET ORAL at 21:54

## 2019-10-16 RX ADMIN — NAPROXEN SCH MG: 375 TABLET ORAL at 09:24

## 2019-10-16 RX ADMIN — DILTIAZEM HYDROCHLORIDE SCH MG: 120 CAPSULE, COATED, EXTENDED RELEASE ORAL at 09:29

## 2019-10-16 NOTE — PROGRESS NOTE
Subjective


Date of service: 10/16/19


Principal diagnosis: Dementia, Schizoaffective disorder


Subjective Comment: 


Patient seen by me this morning. He is restless, agitated and confused. Per 

nursing notes patient is alert and oriented to person. Patient has been 

irritable and restless, Pt continues to be impulsive and unpredictable. He is 

a/o to self and is able to follow simple directions.  Pt is currently taking prn

meds to stay calm. Pt is compliant with meds when crushed, appetite is good, pt 

needs assistance with feeding and with adls. Pt is able to stand and pivot, gait

unsteady and he remains a high fall risk.  Pt slept well hob elevated due to 

sleep apnea and snoring. Pt slept for 7 hrs.


MSE


Orientation: person


Affect: agitated


Mood: congruent with affect


Thought Process: Disorganized


Perceptions: none


Speech: paucity


Concentration: unable to pay attention


Motor activity: agitated


Level of consciousness: alert


Memory: Recent Impaired, Remote Impaired


Interaction: hostile, uncooperative





Objective





- Criteria for Continued Treatment


Criteria for Continued Treatment: Improving Level of Functioning, Stablizing 

Level of Functioning, Improving Emotional/Socia





- Objective Observation


Participation Level: Minimal





Assessment and Plan





- Patient Problems


(1) Major neurocognitive disorder due to Alzheimer's disease, with behavioral 

disturbance


Current Visit: Yes   Status: Acute   





(2) Schizoaffective disorder, bipolar type


Current Visit: Yes   Status: Acute   


Plan to address problem: 


Patient will be admitted for inpatient psychiatric evaluation, medication 

adjustment and close monitoring


 The patient's behavior, mood, sleep and appetite will be closely monitored.


 Patient will be enrolled in individual and group therapeutic sessions and 

encouraged to attend.


 Patient will be provided with a safe and structured environment.


 Patient's physical health needs will be addressed by the Hospitalist.


 Social Assessment will be completed and the  will work with 

patient and family to ensure a suitable and safe disposition


 Medication adjustment will be made as clinically indicated


Check Depakote level in am tomorrow. 


 The patient agreed on the treatment plan, understood the risk, benefit, 

alternative treatment, potential consequence of no treatment, and gave informed 

consent.








Medications and Allergies


                                    Allergies











Allergy/AdvReac Type Severity Reaction Status Date / Time


 


aripiprazole [From Abilify] Allergy  Unknown Verified 10/12/19 23:01


 


diazepam [From Valium] Allergy  Unknown Verified 10/12/19 23:01


 


lithium Allergy  Unknown Verified 10/12/19 23:01


 


lorazepam [From Ativan] Allergy  Unknown Verified 10/12/19 23:01











                                Home Medications











 Medication  Instructions  Recorded  Confirmed  Last Taken  Type


 


AtorvaSTATin [Lipitor] 40 mg PO QHS 10/13/19 10/14/19 Unknown History


 


Divalproex Sprinkle [Depakote 500 mg PO Q8H 10/13/19 10/14/19 Unknown History





Sprinkle]     


 


Metoprolol Xl [Metoprolol 50 mg PO DAILY 10/13/19 10/14/19 Unknown History





SUCCINATE ER TAB]     


 


Mirtazapine [Remeron 30mg TAB] 30 mg PO QHS 10/13/19 10/14/19 Unknown History


 


Modafinil [Provigil] 100 mg PO DAILY 10/13/19 10/14/19 Unknown History


 


Naproxen [Naproxen DR] 375 mg PO BID 10/13/19 10/14/19 Unknown History


 


OLANzapine [ZyPREXA] 5 mg PO TID 10/13/19 10/14/19 Unknown History


 


Venlafaxine  mg PO DAILY 10/13/19 10/14/19 Unknown History


 


dilTIAZem CD [Cardizem Cd] 120 mg PO DAILY 10/13/19 10/13/19 Unknown History


 


OLANzapine ZYDIS [ZyPREXA Zydis] 10 mg PO TID PRN 10/14/19 10/14/19 Unknown 

History











Active Meds: 


Active Medications





Aspirin (Halfprin Ec)  81 mg PO QDAY [facility]


   Last Admin: 10/15/19 10:37 Dose:  81 mg


   Documented by: 


Atorvastatin Calcium (Lipitor)  40 mg PO QHS [facility]


   Last Admin: 10/15/19 21:31 Dose:  40 mg


   Documented by: 


Diltiazem HCl (Cardizem Cd)  120 mg PO QDAY [facility]


   Last Admin: 10/15/19 10:35 Dose:  120 mg


   Documented by: 


Diphenhydramine HCl (Benadryl)  50 mg IM Q6H PRN


   PRN Reason: Extrapyramidal Effects


   Last Admin: 10/15/19 14:55 Dose:  50 mg


   Documented by: 


Diphenhydramine HCl (Benadryl)  50 mg PO Q6H PRN


   PRN Reason: Extrapyramidal Effects


Divalproex Sodium (Depakote Sprinkle)  500 mg PO Q8HR [facility]


   Last Admin: 10/16/19 06:07 Dose:  500 mg


   Documented by: 


Guaifenesin (Mucinex Er)  600 mg PO BID [facility]


   Last Admin: 10/15/19 21:31 Dose:  600 mg


   Documented by: 


Haloperidol (Haldol)  5 mg PO Q6H PRN


   PRN Reason: Agitation


Haloperidol Lactate (Haldol)  5 mg IM Q6H PRN


   PRN Reason: Agitation


   Last Admin: 10/15/19 14:55 Dose:  5 mg


   Documented by: 


Melatonin (Melatonin)  3 mg PO QHS [facility]


   Last Admin: 10/15/19 21:32 Dose:  3 mg


   Documented by: 


Metoprolol Succinate (Toprol Xl)  50 mg PO QDAY [facility]


   Last Admin: 10/15/19 10:37 Dose:  50 mg


   Documented by: 


Mirtazapine (Remeron)  30 mg PO QHS [facility]


   Last Admin: 10/15/19 21:31 Dose:  30 mg


   Documented by: 


Modafinil (Provigil)  100 mg PO QAM [facility]


   Last Admin: 10/15/19 10:35 Dose:  100 mg


   Documented by: 


Naproxen (Naprosyn)  375 mg PO BID [facility]


   Last Admin: 10/15/19 21:32 Dose:  375 mg


   Documented by: 


Olanzapine (Zyprexa Zydis)  10 mg PO TID PRN


   PRN Reason: Agitation


   Last Admin: 10/13/19 17:52 Dose:  10 mg


   Documented by: 


Olanzapine (Zyprexa Zydis)  5 mg PO TID [facility]


   Last Admin: 10/15/19 20:00 Dose:  5 mg


   Documented by: 


Venlafaxine HCl (Effexor Xr)  150 mg PO QDAY [facility]


   Last Admin: 10/15/19 10:37 Dose:  150 mg


   Documented by: 











Results





- Results


Labs/Vitals: 


                             Laboratory Last Values











WBC  6.2 K/mm3 (4.5-11.0)   10/13/19  09:24    


 


RBC  4.94 M/mm3 (3.65-5.03)   10/13/19  09:24    


 


Hgb  14.8 gm/dl (11.8-15.2)   10/13/19  09:24    


 


Hct  44.2 % (35.5-45.6)   10/13/19  09:24    


 


MCV  90 fl (84-94)   10/13/19  09:24    


 


MCH  30 pg (28-32)   10/13/19  09:24    


 


MCHC  33 % (32-34)   10/13/19  09:24    


 


RDW  15.8 % (13.2-15.2)  H  10/13/19  09:24    


 


Plt Count  158 K/mm3 (140-440)   10/13/19  09:24    


 


Lymph % (Auto)  23.4 % (13.4-35.0)   10/13/19  09:24    


 


Mono % (Auto)  10.1 % (0.0-7.3)  H  10/13/19  09:24    


 


Eos % (Auto)  0.1 % (0.0-4.3)   10/13/19  09:24    


 


Baso % (Auto)  0.7 % (0.0-1.8)   10/13/19  09:24    


 


Lymph #  1.5 K/mm3 (1.2-5.4)   10/13/19  09:24    


 


Mono #  0.6 K/mm3 (0.0-0.8)   10/13/19  09:24    


 


Eos #  0.0 K/mm3 (0.0-0.4)   10/13/19  09:24    


 


Baso #  0.0 K/mm3 (0.0-0.1)   10/13/19  09:24    


 


Seg Neutrophils %  65.7 % (40.0-70.0)   10/13/19  09:24    


 


Seg Neutrophils #  4.1 K/mm3 (1.8-7.7)   10/13/19  09:24    


 


Sodium  145 mmol/L (137-145)   10/13/19  09:24    


 


Potassium  4.3 mmol/L (3.6-5.0)   10/13/19  09:24    


 


Chloride  104.1 mmol/L ()   10/13/19  09:24    


 


Carbon Dioxide  25 mmol/L (22-30)   10/13/19  09:24    


 


Anion Gap  20 mmol/L  10/13/19  09:24    


 


BUN  35 mg/dL (9-20)  H  10/13/19  09:24    


 


Creatinine  1.1 mg/dL (0.8-1.5)   10/13/19  09:24    


 


Estimated GFR  > 60 ml/min  10/13/19  09:24    


 


BUN/Creatinine Ratio  32 %  10/13/19  09:24    


 


Glucose  98 mg/dL ()   10/13/19  09:24    


 


POC Glucose  106  ()  H  10/13/19  16:39    


 


Hemoglobin A1c  5.5 % (4-6)   10/13/19  09:24    


 


Calcium  9.5 mg/dL (8.4-10.2)   10/13/19  09:24    


 


Total Bilirubin  0.40 mg/dL (0.1-1.2)   10/13/19  09:24    


 


AST  29 units/L (5-40)   10/13/19  09:24    


 


ALT  20 units/L (7-56)   10/13/19  09:24    


 


Alkaline Phosphatase  83 units/L ()   10/13/19  09:24    


 


Total Protein  6.5 g/dL (6.3-8.2)   10/13/19  09:24    


 


Albumin  4.2 g/dL (3.9-5)   10/13/19  09:24    


 


Albumin/Globulin Ratio  1.8 %  10/13/19  09:24    


 


Triglycerides  129 mg/dL (2-149)   10/13/19  09:24    


 


Cholesterol  133 mg/dL ()   10/13/19  09:24    


 


LDL Cholesterol Direct  74 mg/dL ()   10/13/19  09:24    


 


HDL Cholesterol  43 mg/dL (40-59)   10/13/19  09:24    


 


Cholesterol/HDL Ratio  3.09 %  10/13/19  09:24    


 


TSH  3.070 mlU/mL (0.270-4.200)   10/13/19  09:24    


 


Valproic Acid  31.8 ug/mL ()  L  10/13/19  09:24    


 


RPR  Nonreactive  (Nonreactive)   10/13/19  09:24    








                                Last Vital Signs











Temp  98.2 F   10/15/19 19:23


 


Pulse  79   10/15/19 19:23


 


Resp  20   10/15/19 19:23


 


BP  125/85   10/15/19 19:23


 


Pulse Ox  99   10/15/19 19:23

## 2019-10-17 RX ADMIN — NAPROXEN SCH MG: 375 TABLET ORAL at 10:50

## 2019-10-17 RX ADMIN — NAPROXEN SCH MG: 375 TABLET ORAL at 21:01

## 2019-10-17 RX ADMIN — MODAFINIL SCH MG: 100 TABLET ORAL at 10:46

## 2019-10-17 RX ADMIN — GUAIFENESIN SCH MG: 600 TABLET ORAL at 10:50

## 2019-10-17 RX ADMIN — DILTIAZEM HYDROCHLORIDE SCH MG: 120 CAPSULE, COATED, EXTENDED RELEASE ORAL at 10:59

## 2019-10-17 RX ADMIN — MIRTAZAPINE SCH MG: 30 TABLET, FILM COATED ORAL at 21:01

## 2019-10-17 RX ADMIN — GUAIFENESIN SCH MG: 600 TABLET ORAL at 21:00

## 2019-10-17 RX ADMIN — VENLAFAXINE HYDROCHLORIDE SCH MG: 75 CAPSULE, EXTENDED RELEASE ORAL at 10:48

## 2019-10-17 RX ADMIN — HALOPERIDOL PRN MG: 5 TABLET ORAL at 15:18

## 2019-10-17 RX ADMIN — METOPROLOL SUCCINATE SCH MG: 50 TABLET, EXTENDED RELEASE ORAL at 11:00

## 2019-10-17 RX ADMIN — Medication SCH MG: at 21:00

## 2019-10-17 RX ADMIN — ASPIRIN SCH MG: 81 TABLET, COATED ORAL at 11:00

## 2019-10-17 NOTE — PROGRESS NOTE
Subjective


Date of service: 10/17/19


Principal diagnosis: Dementia, Schizoaffective disorder


Subjective Comment: 


No new event. Pt continues to be impulsive and unpredictable. He is a/o to self 

and is able to follow simple directions.  


Serum Valproic acid level this morning 67.4


MSE


Orientation: person


Affect: agitated


Mood: congruent with affect


Thought Process: Disorganized


Perceptions: none


Speech: paucity


Concentration: unable to pay attention


Motor activity: agitated


Level of consciousness: alert


Memory: Recent Impaired, Remote Impaired


Interaction: hostile, uncooperative





Objective





- Criteria for Continued Treatment


Criteria for Continued Treatment: Reducing Isolative Behaviors, Stablizing Level

of Functioning, Improving Emotional/Socia





- Objective Observation


Participation Level: Minimal


Reason(s) For Not Participating: Unable





Assessment and Plan





- Patient Problems


(1) Major neurocognitive disorder due to Alzheimer's disease, with behavioral 

disturbance


Current Visit: Yes   Status: Acute   





(2) Schizoaffective disorder, bipolar type


Current Visit: Yes   Status: Acute   


Plan to address problem: 


Patient will be admitted for inpatient psychiatric evaluation, medication 

adjustment and close monitoring


 The patient's behavior, mood, sleep and appetite will be closely monitored.


 Patient will be enrolled in individual and group therapeutic sessions and 

encouraged to attend.


 Patient will be provided with a safe and structured environment.


 Patient's physical health needs will be addressed by the Hospitalist.


 Social Assessment will be completed and the  will work with 

patient and family to ensure a suitable and safe disposition


 Medication adjustment will be made as clinically indicated


 The patient agreed on the treatment plan, understood the risk, benefit, 

alternative treatment, potential consequence of no treatment, and gave informed 

consent.








Medications and Allergies


                                    Allergies











Allergy/AdvReac Type Severity Reaction Status Date / Time


 


aripiprazole [From Abilify] Allergy  Unknown Verified 10/12/19 23:01


 


diazepam [From Valium] Allergy  Unknown Verified 10/12/19 23:01


 


lithium Allergy  Unknown Verified 10/12/19 23:01


 


lorazepam [From Ativan] Allergy  Unknown Verified 10/12/19 23:01











                                Home Medications











 Medication  Instructions  Recorded  Confirmed  Last Taken  Type


 


AtorvaSTATin [Lipitor] 40 mg PO QHS 10/13/19 10/14/19 Unknown History


 


Divalproex Sprinkle [Depakote 500 mg PO Q8H 10/13/19 10/14/19 Unknown History





Sprinkle]     


 


Metoprolol Xl [Metoprolol 50 mg PO DAILY 10/13/19 10/14/19 Unknown History





SUCCINATE ER TAB]     


 


Mirtazapine [Remeron 30mg TAB] 30 mg PO QHS 10/13/19 10/14/19 Unknown History


 


Modafinil [Provigil] 100 mg PO DAILY 10/13/19 10/14/19 Unknown History


 


Naproxen [Naproxen DR] 375 mg PO BID 10/13/19 10/14/19 Unknown History


 


OLANzapine [ZyPREXA] 5 mg PO TID 10/13/19 10/14/19 Unknown History


 


Venlafaxine  mg PO DAILY 10/13/19 10/14/19 Unknown History


 


dilTIAZem CD [Cardizem Cd] 120 mg PO DAILY 10/13/19 10/13/19 Unknown History


 


OLANzapine ZYDIS [ZyPREXA Zydis] 10 mg PO TID PRN 10/14/19 10/14/19 Unknown 

History











Active Meds: 


Active Medications





Aspirin (Halfprin Ec)  81 mg PO QDAY [facility]


   Last Admin: 10/17/19 11:00 Dose:  81 mg


   Documented by: 


Atorvastatin Calcium (Lipitor)  40 mg PO QHS [facility]


   Last Admin: 10/17/19 21:01 Dose:  40 mg


   Documented by: 


Diltiazem HCl (Cardizem Cd)  120 mg PO QDAY [facility]


   Last Admin: 10/17/19 10:59 Dose:  120 mg


   Documented by: 


Diphenhydramine HCl (Benadryl)  50 mg IM Q6H PRN


   PRN Reason: Extrapyramidal Effects


   Last Admin: 10/16/19 10:30 Dose:  50 mg


   Documented by: 


Diphenhydramine HCl (Benadryl)  50 mg PO Q6H PRN


   PRN Reason: Extrapyramidal Effects


Divalproex Sodium (Depakote Sprinkle)  500 mg PO Q8HR [facility]


   Last Admin: 10/17/19 21:01 Dose:  500 mg


   Documented by: 


Guaifenesin (Mucinex Er)  600 mg PO BID [facility]


   Last Admin: 10/17/19 21:00 Dose:  600 mg


   Documented by: 


Haloperidol (Haldol)  5 mg PO Q6H PRN


   PRN Reason: Agitation


   Last Admin: 10/17/19 15:18 Dose:  5 mg


   Documented by: 


Haloperidol Lactate (Haldol)  5 mg IM Q6H PRN


   PRN Reason: Agitation


   Last Admin: 10/16/19 16:01 Dose:  5 mg


   Documented by: 


Melatonin (Melatonin)  3 mg PO QHS [facility]


   Last Admin: 10/17/19 21:00 Dose:  3 mg


   Documented by: 


Metoprolol Succinate (Toprol Xl)  50 mg PO QDAY [facility]


   Last Admin: 10/17/19 11:00 Dose:  50 mg


   Documented by: 


Mirtazapine (Remeron)  30 mg PO QHS [facility]


   Last Admin: 10/17/19 21:01 Dose:  30 mg


   Documented by: 


Modafinil (Provigil)  100 mg PO QAM [facility]


   Last Admin: 10/17/19 10:46 Dose:  100 mg


   Documented by: 


Naproxen (Naprosyn)  375 mg PO BID [facility]


   Last Admin: 10/17/19 21:01 Dose:  375 mg


   Documented by: 


Olanzapine (Zyprexa Zydis)  10 mg PO TID PRN


   PRN Reason: Agitation


   Last Admin: 10/17/19 15:33 Dose:  10 mg


   Documented by: 


Olanzapine (Zyprexa Zydis)  5 mg PO TID [facility]


   Last Admin: 10/17/19 21:00 Dose:  5 mg


   Documented by: 


Venlafaxine HCl (Effexor Xr)  150 mg PO QDAY [facility]


   Last Admin: 10/17/19 10:48 Dose:  150 mg


   Documented by: 











Results





- Results


Labs/Vitals: 


                             Laboratory Last Values











WBC  6.2 K/mm3 (4.5-11.0)   10/13/19  09:24    


 


RBC  4.94 M/mm3 (3.65-5.03)   10/13/19  09:24    


 


Hgb  14.8 gm/dl (11.8-15.2)   10/13/19  09:24    


 


Hct  44.2 % (35.5-45.6)   10/13/19  09:24    


 


MCV  90 fl (84-94)   10/13/19  09:24    


 


MCH  30 pg (28-32)   10/13/19  09:24    


 


MCHC  33 % (32-34)   10/13/19  09:24    


 


RDW  15.8 % (13.2-15.2)  H  10/13/19  09:24    


 


Plt Count  158 K/mm3 (140-440)   10/13/19  09:24    


 


Lymph % (Auto)  23.4 % (13.4-35.0)   10/13/19  09:24    


 


Mono % (Auto)  10.1 % (0.0-7.3)  H  10/13/19  09:24    


 


Eos % (Auto)  0.1 % (0.0-4.3)   10/13/19  09:24    


 


Baso % (Auto)  0.7 % (0.0-1.8)   10/13/19  09:24    


 


Lymph #  1.5 K/mm3 (1.2-5.4)   10/13/19  09:24    


 


Mono #  0.6 K/mm3 (0.0-0.8)   10/13/19  09:24    


 


Eos #  0.0 K/mm3 (0.0-0.4)   10/13/19  09:24    


 


Baso #  0.0 K/mm3 (0.0-0.1)   10/13/19  09:24    


 


Seg Neutrophils %  65.7 % (40.0-70.0)   10/13/19  09:24    


 


Seg Neutrophils #  4.1 K/mm3 (1.8-7.7)   10/13/19  09:24    


 


Sodium  145 mmol/L (137-145)   10/13/19  09:24    


 


Potassium  4.3 mmol/L (3.6-5.0)   10/13/19  09:24    


 


Chloride  104.1 mmol/L ()   10/13/19  09:24    


 


Carbon Dioxide  25 mmol/L (22-30)   10/13/19  09:24    


 


Anion Gap  20 mmol/L  10/13/19  09:24    


 


BUN  35 mg/dL (9-20)  H  10/13/19  09:24    


 


Creatinine  1.1 mg/dL (0.8-1.5)   10/13/19  09:24    


 


Estimated GFR  > 60 ml/min  10/13/19  09:24    


 


BUN/Creatinine Ratio  32 %  10/13/19  09:24    


 


Glucose  98 mg/dL ()   10/13/19  09:24    


 


POC Glucose  106  ()  H  10/13/19  16:39    


 


Hemoglobin A1c  5.5 % (4-6)   10/13/19  09:24    


 


Calcium  9.5 mg/dL (8.4-10.2)   10/13/19  09:24    


 


Total Bilirubin  0.40 mg/dL (0.1-1.2)   10/13/19  09:24    


 


AST  29 units/L (5-40)   10/13/19  09:24    


 


ALT  20 units/L (7-56)   10/13/19  09:24    


 


Alkaline Phosphatase  83 units/L ()   10/13/19  09:24    


 


Total Protein  6.5 g/dL (6.3-8.2)   10/13/19  09:24    


 


Albumin  4.2 g/dL (3.9-5)   10/13/19  09:24    


 


Albumin/Globulin Ratio  1.8 %  10/13/19  09:24    


 


Triglycerides  129 mg/dL (2-149)   10/13/19  09:24    


 


Cholesterol  133 mg/dL ()   10/13/19  09:24    


 


LDL Cholesterol Direct  74 mg/dL ()   10/13/19  09:24    


 


HDL Cholesterol  43 mg/dL (40-59)   10/13/19  09:24    


 


Cholesterol/HDL Ratio  3.09 %  10/13/19  09:24    


 


TSH  3.070 mlU/mL (0.270-4.200)   10/13/19  09:24    


 


Valproic Acid  67.4 ug/mL ()   10/17/19  08:03    


 


RPR  Nonreactive  (Nonreactive)   10/13/19  09:24    








                                Last Vital Signs











Temp  97.9 F   10/17/19 09:30


 


Pulse  104 H  10/17/19 11:00


 


Resp  16   10/17/19 09:30


 


BP  160/93   10/17/19 11:00


 


Pulse Ox  96   10/17/19 09:30

## 2019-10-18 RX ADMIN — Medication SCH MG: at 21:05

## 2019-10-18 RX ADMIN — GUAIFENESIN SCH MG: 600 TABLET ORAL at 11:20

## 2019-10-18 RX ADMIN — NAPROXEN SCH MG: 375 TABLET ORAL at 11:27

## 2019-10-18 RX ADMIN — METOPROLOL SUCCINATE SCH MG: 50 TABLET, EXTENDED RELEASE ORAL at 15:06

## 2019-10-18 RX ADMIN — MODAFINIL SCH MG: 100 TABLET ORAL at 11:28

## 2019-10-18 RX ADMIN — GUAIFENESIN SCH MG: 600 TABLET ORAL at 21:05

## 2019-10-18 RX ADMIN — ASPIRIN SCH MG: 81 TABLET, COATED ORAL at 11:26

## 2019-10-18 RX ADMIN — DILTIAZEM HYDROCHLORIDE SCH MG: 120 CAPSULE, COATED, EXTENDED RELEASE ORAL at 11:26

## 2019-10-18 RX ADMIN — VENLAFAXINE HYDROCHLORIDE SCH MG: 75 CAPSULE, EXTENDED RELEASE ORAL at 11:19

## 2019-10-18 RX ADMIN — MIRTAZAPINE SCH MG: 30 TABLET, FILM COATED ORAL at 21:04

## 2019-10-18 RX ADMIN — NAPROXEN SCH MG: 375 TABLET ORAL at 21:04

## 2019-10-18 NOTE — XRAY REPORT
[facility] 



INDICATION:  Pre NH placement screening for TB.



COMPARISON:  None



FINDINGS:

Support devices: None. 



Heart: Within normal limits. 

Lungs/Pleura: The lungs are hyperinflated consistent with underlying emphysematous changes. No eviden
ce for infiltrate, pleural effusion or pneumothorax. 



Additional findings: Surgical clips in the left axilla and left shoulder rotator cuff repair changes 
are noted, correlate with history.



IMPRESSION:

 Hyperinflated lungs. No acute cardiopulmonary process.



Signer Name: Jesus Perez Jr, MD 

Signed: 10/18/2019 3:27 PM

 Workstation Name: [id number]

## 2019-10-18 NOTE — PROGRESS NOTE
Subjective


Date of service: 10/18/19


Principal diagnosis: Dementia, Schizoaffective disorder


Subjective Comment: 


No new event. Pt continues to be impulsive and unpredictable. He is a/o to self 

and is able to follow simple directions.  


Serum Valproic acid level 67.4


MSE


Orientation: person


Affect: agitated


Mood: congruent with affect


Thought Process: Disorganized


Perceptions: none


Speech: paucity


Concentration: unable to pay attention


Motor activity: agitated


Level of consciousness: alert


Memory: Recent Impaired, Remote Impaired


Interaction: hostile, uncooperative





Objective





- Criteria for Continued Treatment


Criteria for Continued Treatment: Reducing Isolative Behaviors, Stablizing Level

of Functioning, Improving Emotional/Socia





- Objective Observation


Participation Level: Minimal


Reason(s) For Not Participating: Unable





Assessment and Plan





- Patient Problems


(1) Major neurocognitive disorder due to Alzheimer's disease, with behavioral 

disturbance


Current Visit: Yes   Status: Acute   





(2) Schizoaffective disorder, bipolar type


Current Visit: Yes   Status: Acute   


Plan to address problem: 


Patient will be admitted for inpatient psychiatric evaluation, medication 

adjustment and close monitoring


 The patient's behavior, mood, sleep and appetite will be closely monitored.


 Patient will be enrolled in individual and group therapeutic sessions and 

encouraged to attend.


 Patient will be provided with a safe and structured environment.


 Patient's physical health needs will be addressed by the Hospitalist.


 Social Assessment will be completed and the  will work with 

patient and family to ensure a suitable and safe disposition


 Medication adjustment will be made as clinically indicated


 The patient agreed on the treatment plan, understood the risk, benefit, 

alternative treatment, potential consequence of no treatment, and gave informed 

consent.








Medications and Allergies


                                    Allergies











Allergy/AdvReac Type Severity Reaction Status Date / Time


 


aripiprazole [From Abilify] Allergy  Unknown Verified 10/12/19 23:01


 


diazepam [From Valium] Allergy  Unknown Verified 10/12/19 23:01


 


lithium Allergy  Unknown Verified 10/12/19 23:01


 


lorazepam [From Ativan] Allergy  Unknown Verified 10/12/19 23:01











                                Home Medications











 Medication  Instructions  Recorded  Confirmed  Last Taken  Type


 


AtorvaSTATin [Lipitor] 40 mg PO QHS 10/13/19 10/14/19 Unknown History


 


Divalproex Sprinkle [Depakote 500 mg PO Q8H 10/13/19 10/14/19 Unknown History





Sprinkle]     


 


Metoprolol Xl [Metoprolol 50 mg PO DAILY 10/13/19 10/14/19 Unknown History





SUCCINATE ER TAB]     


 


Mirtazapine [Remeron 30mg TAB] 30 mg PO QHS 10/13/19 10/14/19 Unknown History


 


Modafinil [Provigil] 100 mg PO DAILY 10/13/19 10/14/19 Unknown History


 


Naproxen [Naproxen DR] 375 mg PO BID 10/13/19 10/14/19 Unknown History


 


OLANzapine [ZyPREXA] 5 mg PO TID 10/13/19 10/14/19 Unknown History


 


Venlafaxine  mg PO DAILY 10/13/19 10/14/19 Unknown History


 


dilTIAZem CD [Cardizem Cd] 120 mg PO DAILY 10/13/19 10/13/19 Unknown History


 


OLANzapine ZYDIS [ZyPREXA Zydis] 10 mg PO TID PRN 10/14/19 10/14/19 Unknown 

History











Active Meds: 


Active Medications





Aspirin (Halfprin Ec)  81 mg PO QDAY [facility]


   Last Admin: 10/17/19 11:00 Dose:  81 mg


   Documented by: 


Atorvastatin Calcium (Lipitor)  40 mg PO QHS [facility]


   Last Admin: 10/17/19 21:01 Dose:  40 mg


   Documented by: 


Diltiazem HCl (Cardizem Cd)  120 mg PO QDAY [facility]


   Last Admin: 10/17/19 10:59 Dose:  120 mg


   Documented by: 


Diphenhydramine HCl (Benadryl)  50 mg IM Q6H PRN


   PRN Reason: Extrapyramidal Effects


   Last Admin: 10/16/19 10:30 Dose:  50 mg


   Documented by: 


Diphenhydramine HCl (Benadryl)  50 mg PO Q6H PRN


   PRN Reason: Extrapyramidal Effects


Divalproex Sodium (Depakote Sprinkle)  500 mg PO Q8HR [facility]


   Last Admin: 10/18/19 05:49 Dose:  500 mg


   Documented by: 


Guaifenesin (Mucinex Er)  600 mg PO BID [facility]


   Last Admin: 10/17/19 21:00 Dose:  600 mg


   Documented by: 


Haloperidol (Haldol)  5 mg PO Q6H PRN


   PRN Reason: Agitation


   Last Admin: 10/17/19 15:18 Dose:  5 mg


   Documented by: 


Haloperidol Lactate (Haldol)  5 mg IM Q6H PRN


   PRN Reason: Agitation


   Last Admin: 10/16/19 16:01 Dose:  5 mg


   Documented by: 


Melatonin (Melatonin)  3 mg PO QHS [facility]


   Last Admin: 10/17/19 21:00 Dose:  3 mg


   Documented by: 


Metoprolol Succinate (Toprol Xl)  50 mg PO QDAY [facility]


   Last Admin: 10/17/19 11:00 Dose:  50 mg


   Documented by: 


Mirtazapine (Remeron)  30 mg PO QHS [facility]


   Last Admin: 10/17/19 21:01 Dose:  30 mg


   Documented by: 


Modafinil (Provigil)  100 mg PO QAM [facility]


   Last Admin: 10/17/19 10:46 Dose:  100 mg


   Documented by: 


Naproxen (Naprosyn)  375 mg PO BID [facility]


   Last Admin: 10/17/19 21:01 Dose:  375 mg


   Documented by: 


Olanzapine (Zyprexa Zydis)  10 mg PO TID PRN


   PRN Reason: Agitation


   Last Admin: 10/17/19 15:33 Dose:  10 mg


   Documented by: 


Olanzapine (Zyprexa Zydis)  5 mg PO TID [facility]


   Last Admin: 10/17/19 21:00 Dose:  5 mg


   Documented by: 


Venlafaxine HCl (Effexor Xr)  150 mg PO QDAY [facility]


   Last Admin: 10/17/19 10:48 Dose:  150 mg


   Documented by: 











Results





- Results


Labs/Vitals: 


                             Laboratory Last Values











WBC  6.2 K/mm3 (4.5-11.0)   10/13/19  09:24    


 


RBC  4.94 M/mm3 (3.65-5.03)   10/13/19  09:24    


 


Hgb  14.8 gm/dl (11.8-15.2)   10/13/19  09:24    


 


Hct  44.2 % (35.5-45.6)   10/13/19  09:24    


 


MCV  90 fl (84-94)   10/13/19  09:24    


 


MCH  30 pg (28-32)   10/13/19  09:24    


 


MCHC  33 % (32-34)   10/13/19  09:24    


 


RDW  15.8 % (13.2-15.2)  H  10/13/19  09:24    


 


Plt Count  158 K/mm3 (140-440)   10/13/19  09:24    


 


Lymph % (Auto)  23.4 % (13.4-35.0)   10/13/19  09:24    


 


Mono % (Auto)  10.1 % (0.0-7.3)  H  10/13/19  09:24    


 


Eos % (Auto)  0.1 % (0.0-4.3)   10/13/19  09:24    


 


Baso % (Auto)  0.7 % (0.0-1.8)   10/13/19  09:24    


 


Lymph #  1.5 K/mm3 (1.2-5.4)   10/13/19  09:24    


 


Mono #  0.6 K/mm3 (0.0-0.8)   10/13/19  09:24    


 


Eos #  0.0 K/mm3 (0.0-0.4)   10/13/19  09:24    


 


Baso #  0.0 K/mm3 (0.0-0.1)   10/13/19  09:24    


 


Seg Neutrophils %  65.7 % (40.0-70.0)   10/13/19  09:24    


 


Seg Neutrophils #  4.1 K/mm3 (1.8-7.7)   10/13/19  09:24    


 


Sodium  145 mmol/L (137-145)   10/13/19  09:24    


 


Potassium  4.3 mmol/L (3.6-5.0)   10/13/19  09:24    


 


Chloride  104.1 mmol/L ()   10/13/19  09:24    


 


Carbon Dioxide  25 mmol/L (22-30)   10/13/19  09:24    


 


Anion Gap  20 mmol/L  10/13/19  09:24    


 


BUN  35 mg/dL (9-20)  H  10/13/19  09:24    


 


Creatinine  1.1 mg/dL (0.8-1.5)   10/13/19  09:24    


 


Estimated GFR  > 60 ml/min  10/13/19  09:24    


 


BUN/Creatinine Ratio  32 %  10/13/19  09:24    


 


Glucose  98 mg/dL ()   10/13/19  09:24    


 


POC Glucose  106  ()  H  10/13/19  16:39    


 


Hemoglobin A1c  5.5 % (4-6)   10/13/19  09:24    


 


Calcium  9.5 mg/dL (8.4-10.2)   10/13/19  09:24    


 


Total Bilirubin  0.40 mg/dL (0.1-1.2)   10/13/19  09:24    


 


AST  29 units/L (5-40)   10/13/19  09:24    


 


ALT  20 units/L (7-56)   10/13/19  09:24    


 


Alkaline Phosphatase  83 units/L ()   10/13/19  09:24    


 


Total Protein  6.5 g/dL (6.3-8.2)   10/13/19  09:24    


 


Albumin  4.2 g/dL (3.9-5)   10/13/19  09:24    


 


Albumin/Globulin Ratio  1.8 %  10/13/19  09:24    


 


Triglycerides  129 mg/dL (2-149)   10/13/19  09:24    


 


Cholesterol  133 mg/dL ()   10/13/19  09:24    


 


LDL Cholesterol Direct  74 mg/dL ()   10/13/19  09:24    


 


HDL Cholesterol  43 mg/dL (40-59)   10/13/19  09:24    


 


Cholesterol/HDL Ratio  3.09 %  10/13/19  09:24    


 


TSH  3.070 mlU/mL (0.270-4.200)   10/13/19  09:24    


 


Valproic Acid  67.4 ug/mL ()   10/17/19  08:03    


 


RPR  Nonreactive  (Nonreactive)   10/13/19  09:24    








                                Last Vital Signs











Temp  97.9 F   10/17/19 09:30


 


Pulse  104 H  10/17/19 11:00


 


Resp  16   10/17/19 09:30


 


BP  160/93   10/17/19 11:00


 


Pulse Ox  96   10/17/19 09:30

## 2019-10-19 RX ADMIN — DILTIAZEM HYDROCHLORIDE SCH MG: 120 CAPSULE, COATED, EXTENDED RELEASE ORAL at 09:46

## 2019-10-19 RX ADMIN — NAPROXEN SCH MG: 375 TABLET ORAL at 21:19

## 2019-10-19 RX ADMIN — NAPROXEN SCH MG: 375 TABLET ORAL at 09:45

## 2019-10-19 RX ADMIN — VENLAFAXINE HYDROCHLORIDE SCH MG: 75 CAPSULE, EXTENDED RELEASE ORAL at 09:47

## 2019-10-19 RX ADMIN — GUAIFENESIN SCH MG: 600 TABLET ORAL at 21:19

## 2019-10-19 RX ADMIN — ASPIRIN SCH MG: 81 TABLET, COATED ORAL at 09:45

## 2019-10-19 RX ADMIN — GUAIFENESIN SCH MG: 600 TABLET ORAL at 09:46

## 2019-10-19 RX ADMIN — MODAFINIL SCH MG: 100 TABLET ORAL at 09:45

## 2019-10-19 RX ADMIN — METOPROLOL SUCCINATE SCH MG: 50 TABLET, EXTENDED RELEASE ORAL at 09:47

## 2019-10-19 RX ADMIN — Medication SCH MG: at 21:19

## 2019-10-20 RX ADMIN — ASPIRIN SCH MG: 81 TABLET, COATED ORAL at 09:41

## 2019-10-20 RX ADMIN — VENLAFAXINE HYDROCHLORIDE SCH MG: 75 CAPSULE, EXTENDED RELEASE ORAL at 09:40

## 2019-10-20 RX ADMIN — MIRTAZAPINE SCH MG: 30 TABLET, FILM COATED ORAL at 00:15

## 2019-10-20 RX ADMIN — MODAFINIL SCH MG: 100 TABLET ORAL at 09:41

## 2019-10-20 RX ADMIN — METOPROLOL SUCCINATE SCH MG: 50 TABLET, EXTENDED RELEASE ORAL at 09:41

## 2019-10-20 RX ADMIN — MIRTAZAPINE SCH MG: 30 TABLET, FILM COATED ORAL at 21:03

## 2019-10-20 RX ADMIN — GUAIFENESIN SCH MG: 600 TABLET ORAL at 09:40

## 2019-10-20 RX ADMIN — GUAIFENESIN SCH MG: 600 TABLET ORAL at 21:02

## 2019-10-20 RX ADMIN — Medication SCH MG: at 21:02

## 2019-10-20 RX ADMIN — NAPROXEN SCH MG: 375 TABLET ORAL at 09:42

## 2019-10-20 RX ADMIN — NAPROXEN SCH MG: 375 TABLET ORAL at 21:01

## 2019-10-20 RX ADMIN — DILTIAZEM HYDROCHLORIDE SCH MG: 120 CAPSULE, COATED, EXTENDED RELEASE ORAL at 09:40

## 2019-10-20 NOTE — PROGRESS NOTE
Subjective


Date of service: 10/20/19


Principal diagnosis: Dementia, Schizoaffective disorder


Subjective Comment: 


Patient is improving. Nursing staff reports that patient is alert and oriented 

to person. patient is restless at times and requires redirection. patient 

ambulates with a limp and unsteady gait. patient was cooperative with morning 

ADL. patient thoughts are disorganized and patient is disoriented. no aggression

this shift. currently medication compliant, tolerating crushed. 


MSE


Orientation: person


Affect: normal


Mood: congruent with affect


Thought Process: Disorganized


Perceptions: none


Speech: paucity


Concentration: unable to pay attention


Motor activity: agitated


Level of consciousness: alert


Memory: Recent Impaired, Remote Impaired


Interaction: cooperative





Objective





- Criteria for Continued Treatment


Criteria for Continued Treatment: Stablizing Level of Functioning, Improving 

Emotional/Socia





- Objective Observation


Participation Level: Minimal


Reason(s) For Not Participating: Unable





Assessment and Plan





- Patient Problems


(1) Major neurocognitive disorder due to Alzheimer's disease, with behavioral 

disturbance


Current Visit: Yes   Status: Acute   





(2) Schizoaffective disorder, bipolar type


Current Visit: Yes   Status: Acute   


Plan to address problem: 


Patient will be admitted for inpatient psychiatric evaluation, medication 

adjustment and close monitoring


 The patient's behavior, mood, sleep and appetite will be closely monitored.


 Patient will be enrolled in individual and group therapeutic sessions and 

encouraged to attend.


 Patient will be provided with a safe and structured environment.


 Patient's physical health needs will be addressed by the Hospitalist.


 Social Assessment will be completed and the  will work with 

patient and family to ensure a suitable and safe disposition


 Medication adjustment will be made as clinically indicated


 The patient agreed on the treatment plan, understood the risk, benefit, 

alternative treatment, potential consequence of no treatment, and gave informed 

consent.








Medications and Allergies


                                    Allergies











Allergy/AdvReac Type Severity Reaction Status Date / Time


 


aripiprazole [From Abilify] Allergy  Unknown Verified 10/12/19 23:01


 


diazepam [From Valium] Allergy  Unknown Verified 10/12/19 23:01


 


lithium Allergy  Unknown Verified 10/12/19 23:01


 


lorazepam [From Ativan] Allergy  Unknown Verified 10/12/19 23:01











                                Home Medications











 Medication  Instructions  Recorded  Confirmed  Last Taken  Type


 


AtorvaSTATin [Lipitor] 40 mg PO QHS 10/13/19 10/14/19 Unknown History


 


Divalproex Sprinkle [Depakote 500 mg PO Q8H 10/13/19 10/14/19 Unknown History





Sprinkle]     


 


Metoprolol Xl [Metoprolol 50 mg PO DAILY 10/13/19 10/14/19 Unknown History





SUCCINATE ER TAB]     


 


Mirtazapine [Remeron 30mg TAB] 30 mg PO QHS 10/13/19 10/14/19 Unknown History


 


Modafinil [Provigil] 100 mg PO DAILY 10/13/19 10/14/19 Unknown History


 


Naproxen [Naproxen DR] 375 mg PO BID 10/13/19 10/14/19 Unknown History


 


OLANzapine [ZyPREXA] 5 mg PO TID 10/13/19 10/14/19 Unknown History


 


Venlafaxine  mg PO DAILY 10/13/19 10/14/19 Unknown History


 


dilTIAZem CD [Cardizem Cd] 120 mg PO DAILY 10/13/19 10/13/19 Unknown History


 


OLANzapine ZYDIS [ZyPREXA Zydis] 10 mg PO TID PRN 10/14/19 10/14/19 Unknown 

History











Active Meds: 


Active Medications





Aspirin (Halfprin Ec)  81 mg PO QDAY [facility]


   Last Admin: 10/20/19 09:41 Dose:  81 mg


   Documented by: 


Atorvastatin Calcium (Lipitor)  40 mg PO QHS [facility]


   Last Admin: 10/19/19 21:20 Dose:  40 mg


   Documented by: 


Diltiazem HCl (Cardizem Cd)  120 mg PO QDAY [facility]


   Last Admin: 10/20/19 09:40 Dose:  120 mg


   Documented by: 


Diphenhydramine HCl (Benadryl)  50 mg IM Q6H PRN


   PRN Reason: Extrapyramidal Effects


   Last Admin: 10/16/19 10:30 Dose:  50 mg


   Documented by: 


Diphenhydramine HCl (Benadryl)  50 mg PO Q6H PRN


   PRN Reason: Extrapyramidal Effects


Divalproex Sodium (Depakote Sprinkle)  500 mg PO Q8HR [facility]


   Last Admin: 10/20/19 14:52 Dose:  500 mg


   Documented by: 


Guaifenesin (Mucinex Er)  600 mg PO BID [facility]


   Last Admin: 10/20/19 09:40 Dose:  600 mg


   Documented by: 


Haloperidol (Haldol)  5 mg PO Q6H PRN


   PRN Reason: Agitation


   Last Admin: 10/17/19 15:18 Dose:  5 mg


   Documented by: 


Haloperidol Lactate (Haldol)  5 mg IM Q6H PRN


   PRN Reason: Agitation


   Last Admin: 10/16/19 16:01 Dose:  5 mg


   Documented by: 


Melatonin (Melatonin)  3 mg PO QHS [facility]


   Last Admin: 10/19/19 21:19 Dose:  3 mg


   Documented by: 


Metoprolol Succinate (Toprol Xl)  50 mg PO QDAY [facility]


   Last Admin: 10/20/19 09:41 Dose:  50 mg


   Documented by: 


Mirtazapine (Remeron)  30 mg PO QHS [facility]


   Last Admin: 10/20/19 00:15 Dose:  30 mg


   Documented by: 


Modafinil (Provigil)  100 mg PO QAM [facility]


   Last Admin: 10/20/19 09:41 Dose:  100 mg


   Documented by: 


Naproxen (Naprosyn)  375 mg PO BID [facility]


   Last Admin: 10/20/19 09:42 Dose:  375 mg


   Documented by: 


Olanzapine (Zyprexa Zydis)  10 mg PO TID PRN


   PRN Reason: Agitation


   Last Admin: 10/17/19 15:33 Dose:  10 mg


   Documented by: 


Olanzapine (Zyprexa Zydis)  5 mg PO TID [facility]


   Last Admin: 10/20/19 14:53 Dose:  5 mg


   Documented by: 


Venlafaxine HCl (Effexor Xr)  150 mg PO QDAY [facility]


   Last Admin: 10/20/19 09:40 Dose:  150 mg


   Documented by: 











Results





- Results


Labs/Vitals: 


                             Laboratory Last Values











WBC  6.2 K/mm3 (4.5-11.0)   10/13/19  09:24    


 


RBC  4.94 M/mm3 (3.65-5.03)   10/13/19  09:24    


 


Hgb  14.8 gm/dl (11.8-15.2)   10/13/19  09:24    


 


Hct  44.2 % (35.5-45.6)   10/13/19  09:24    


 


MCV  90 fl (84-94)   10/13/19  09:24    


 


MCH  30 pg (28-32)   10/13/19  09:24    


 


MCHC  33 % (32-34)   10/13/19  09:24    


 


RDW  15.8 % (13.2-15.2)  H  10/13/19  09:24    


 


Plt Count  158 K/mm3 (140-440)   10/13/19  09:24    


 


Lymph % (Auto)  23.4 % (13.4-35.0)   10/13/19  09:24    


 


Mono % (Auto)  10.1 % (0.0-7.3)  H  10/13/19  09:24    


 


Eos % (Auto)  0.1 % (0.0-4.3)   10/13/19  09:24    


 


Baso % (Auto)  0.7 % (0.0-1.8)   10/13/19  09:24    


 


Lymph #  1.5 K/mm3 (1.2-5.4)   10/13/19  09:24    


 


Mono #  0.6 K/mm3 (0.0-0.8)   10/13/19  09:24    


 


Eos #  0.0 K/mm3 (0.0-0.4)   10/13/19  09:24    


 


Baso #  0.0 K/mm3 (0.0-0.1)   10/13/19  09:24    


 


Seg Neutrophils %  65.7 % (40.0-70.0)   10/13/19  09:24    


 


Seg Neutrophils #  4.1 K/mm3 (1.8-7.7)   10/13/19  09:24    


 


Sodium  145 mmol/L (137-145)   10/13/19  09:24    


 


Potassium  4.3 mmol/L (3.6-5.0)   10/13/19  09:24    


 


Chloride  104.1 mmol/L ()   10/13/19  09:24    


 


Carbon Dioxide  25 mmol/L (22-30)   10/13/19  09:24    


 


Anion Gap  20 mmol/L  10/13/19  09:24    


 


BUN  35 mg/dL (9-20)  H  10/13/19  09:24    


 


Creatinine  1.1 mg/dL (0.8-1.5)   10/13/19  09:24    


 


Estimated GFR  > 60 ml/min  10/13/19  09:24    


 


BUN/Creatinine Ratio  32 %  10/13/19  09:24    


 


Glucose  98 mg/dL ()   10/13/19  09:24    


 


POC Glucose  106  ()  H  10/13/19  16:39    


 


Hemoglobin A1c  5.5 % (4-6)   10/13/19  09:24    


 


Calcium  9.5 mg/dL (8.4-10.2)   10/13/19  09:24    


 


Total Bilirubin  0.40 mg/dL (0.1-1.2)   10/13/19  09:24    


 


AST  29 units/L (5-40)   10/13/19  09:24    


 


ALT  20 units/L (7-56)   10/13/19  09:24    


 


Alkaline Phosphatase  83 units/L ()   10/13/19  09:24    


 


Total Protein  6.5 g/dL (6.3-8.2)   10/13/19  09:24    


 


Albumin  4.2 g/dL (3.9-5)   10/13/19  09:24    


 


Albumin/Globulin Ratio  1.8 %  10/13/19  09:24    


 


Triglycerides  129 mg/dL (2-149)   10/13/19  09:24    


 


Cholesterol  133 mg/dL ()   10/13/19  09:24    


 


LDL Cholesterol Direct  74 mg/dL ()   10/13/19  09:24    


 


HDL Cholesterol  43 mg/dL (40-59)   10/13/19  09:24    


 


Cholesterol/HDL Ratio  3.09 %  10/13/19  09:24    


 


TSH  3.070 mlU/mL (0.270-4.200)   10/13/19  09:24    


 


Valproic Acid  67.4 ug/mL ()   10/17/19  08:03    


 


RPR  Nonreactive  (Nonreactive)   10/13/19  09:24    








                                Last Vital Signs











Temp  97.3 F L  10/19/19 18:54


 


Pulse  84   10/20/19 10:00


 


Resp  16   10/20/19 09:15


 


BP  140/67   10/20/19 10:00


 


Pulse Ox  100   10/20/19 09:15

## 2019-10-20 NOTE — PROGRESS NOTE
Subjective


Date of service: 10/19/19


Principal diagnosis: Dementia, Schizoaffective disorder


Subjective Comment: 


No new event. Pt continues to be impulsive and unpredictable. He is a/o to self 

and is able to follow simple directions.  


Serum Valproic acid level 67.4


MSE


Orientation: person


Affect: agitated


Mood: congruent with affect


Thought Process: Disorganized


Perceptions: none


Speech: paucity


Concentration: unable to pay attention


Motor activity: agitated


Level of consciousness: alert


Memory: Recent Impaired, Remote Impaired


Interaction: hostile, uncooperative





Objective





- Criteria for Continued Treatment


Criteria for Continued Treatment: Improving Level of Functioning, Stablizing 

Level of Functioning, Improving Emotional/Socia





- Objective Observation


Participation Level: Minimal


Reason(s) For Not Participating: Unable





Assessment and Plan





- Patient Problems


(1) Major neurocognitive disorder due to Alzheimer's disease, with behavioral 

disturbance


Current Visit: Yes   Status: Acute   





(2) Schizoaffective disorder, bipolar type


Current Visit: Yes   Status: Acute   


Plan to address problem: 


Patient will be admitted for inpatient psychiatric evaluation, medication 

adjustment and close monitoring


 The patient's behavior, mood, sleep and appetite will be closely monitored.


 Patient will be enrolled in individual and group therapeutic sessions and 

encouraged to attend.


 Patient will be provided with a safe and structured environment.


 Patient's physical health needs will be addressed by the Hospitalist.


 Social Assessment will be completed and the  will work with 

patient and family to ensure a suitable and safe disposition


 Medication adjustment will be made as clinically indicated


 The patient agreed on the treatment plan, understood the risk, benefit, 

alternative treatment, potential consequence of no treatment, and gave informed 

consent.








Medications and Allergies


                                    Allergies











Allergy/AdvReac Type Severity Reaction Status Date / Time


 


aripiprazole [From Abilify] Allergy  Unknown Verified 10/12/19 23:01


 


diazepam [From Valium] Allergy  Unknown Verified 10/12/19 23:01


 


lithium Allergy  Unknown Verified 10/12/19 23:01


 


lorazepam [From Ativan] Allergy  Unknown Verified 10/12/19 23:01











                                Home Medications











 Medication  Instructions  Recorded  Confirmed  Last Taken  Type


 


AtorvaSTATin [Lipitor] 40 mg PO QHS 10/13/19 10/14/19 Unknown History


 


Divalproex Sprinkle [Depakote 500 mg PO Q8H 10/13/19 10/14/19 Unknown History





Sprinkle]     


 


Metoprolol Xl [Metoprolol 50 mg PO DAILY 10/13/19 10/14/19 Unknown History





SUCCINATE ER TAB]     


 


Mirtazapine [Remeron 30mg TAB] 30 mg PO QHS 10/13/19 10/14/19 Unknown History


 


Modafinil [Provigil] 100 mg PO DAILY 10/13/19 10/14/19 Unknown History


 


Naproxen [Naproxen DR] 375 mg PO BID 10/13/19 10/14/19 Unknown History


 


OLANzapine [ZyPREXA] 5 mg PO TID 10/13/19 10/14/19 Unknown History


 


Venlafaxine  mg PO DAILY 10/13/19 10/14/19 Unknown History


 


dilTIAZem CD [Cardizem Cd] 120 mg PO DAILY 10/13/19 10/13/19 Unknown History


 


OLANzapine ZYDIS [ZyPREXA Zydis] 10 mg PO TID PRN 10/14/19 10/14/19 Unknown 

History











Active Meds: 


Active Medications





Aspirin (Halfprin Ec)  81 mg PO QDAY [facility]


   Last Admin: 10/19/19 09:45 Dose:  81 mg


   Documented by: 


Atorvastatin Calcium (Lipitor)  40 mg PO QHS [facility]


   Last Admin: 10/19/19 21:20 Dose:  40 mg


   Documented by: 


Diltiazem HCl (Cardizem Cd)  120 mg PO QDAY [facility]


   Last Admin: 10/19/19 09:46 Dose:  120 mg


   Documented by: 


Diphenhydramine HCl (Benadryl)  50 mg IM Q6H PRN


   PRN Reason: Extrapyramidal Effects


   Last Admin: 10/16/19 10:30 Dose:  50 mg


   Documented by: 


Diphenhydramine HCl (Benadryl)  50 mg PO Q6H PRN


   PRN Reason: Extrapyramidal Effects


Divalproex Sodium (Depakote Sprinkle)  500 mg PO Q8HR [facility]


   Last Admin: 10/19/19 21:20 Dose:  500 mg


   Documented by: 


Guaifenesin (Mucinex Er)  600 mg PO BID [facility]


   Last Admin: 10/19/19 21:19 Dose:  600 mg


   Documented by: 


Haloperidol (Haldol)  5 mg PO Q6H PRN


   PRN Reason: Agitation


   Last Admin: 10/17/19 15:18 Dose:  5 mg


   Documented by: 


Haloperidol Lactate (Haldol)  5 mg IM Q6H PRN


   PRN Reason: Agitation


   Last Admin: 10/16/19 16:01 Dose:  5 mg


   Documented by: 


Melatonin (Melatonin)  3 mg PO QHS [facility]


   Last Admin: 10/19/19 21:19 Dose:  3 mg


   Documented by: 


Metoprolol Succinate (Toprol Xl)  50 mg PO QDAY [facility]


   Last Admin: 10/19/19 09:47 Dose:  50 mg


   Documented by: 


Mirtazapine (Remeron)  30 mg PO QHS [facility]


   Last Admin: 10/20/19 00:15 Dose:  30 mg


   Documented by: 


Modafinil (Provigil)  100 mg PO QAM [facility]


   Last Admin: 10/19/19 09:45 Dose:  100 mg


   Documented by: 


Naproxen (Naprosyn)  375 mg PO BID [facility]


   Last Admin: 10/19/19 21:19 Dose:  375 mg


   Documented by: 


Olanzapine (Zyprexa Zydis)  10 mg PO TID PRN


   PRN Reason: Agitation


   Last Admin: 10/17/19 15:33 Dose:  10 mg


   Documented by: 


Olanzapine (Zyprexa Zydis)  5 mg PO TID [facility]


   Last Admin: 10/19/19 21:20 Dose:  5 mg


   Documented by: 


Venlafaxine HCl (Effexor Xr)  150 mg PO QDAY [facility]


   Last Admin: 10/19/19 09:47 Dose:  150 mg


   Documented by: 











Results





- Results


Labs/Vitals: 


                             Laboratory Last Values











WBC  6.2 K/mm3 (4.5-11.0)   10/13/19  09:24    


 


RBC  4.94 M/mm3 (3.65-5.03)   10/13/19  09:24    


 


Hgb  14.8 gm/dl (11.8-15.2)   10/13/19  09:24    


 


Hct  44.2 % (35.5-45.6)   10/13/19  09:24    


 


MCV  90 fl (84-94)   10/13/19  09:24    


 


MCH  30 pg (28-32)   10/13/19  09:24    


 


MCHC  33 % (32-34)   10/13/19  09:24    


 


RDW  15.8 % (13.2-15.2)  H  10/13/19  09:24    


 


Plt Count  158 K/mm3 (140-440)   10/13/19  09:24    


 


Lymph % (Auto)  23.4 % (13.4-35.0)   10/13/19  09:24    


 


Mono % (Auto)  10.1 % (0.0-7.3)  H  10/13/19  09:24    


 


Eos % (Auto)  0.1 % (0.0-4.3)   10/13/19  09:24    


 


Baso % (Auto)  0.7 % (0.0-1.8)   10/13/19  09:24    


 


Lymph #  1.5 K/mm3 (1.2-5.4)   10/13/19  09:24    


 


Mono #  0.6 K/mm3 (0.0-0.8)   10/13/19  09:24    


 


Eos #  0.0 K/mm3 (0.0-0.4)   10/13/19  09:24    


 


Baso #  0.0 K/mm3 (0.0-0.1)   10/13/19  09:24    


 


Seg Neutrophils %  65.7 % (40.0-70.0)   10/13/19  09:24    


 


Seg Neutrophils #  4.1 K/mm3 (1.8-7.7)   10/13/19  09:24    


 


Sodium  145 mmol/L (137-145)   10/13/19  09:24    


 


Potassium  4.3 mmol/L (3.6-5.0)   10/13/19  09:24    


 


Chloride  104.1 mmol/L ()   10/13/19  09:24    


 


Carbon Dioxide  25 mmol/L (22-30)   10/13/19  09:24    


 


Anion Gap  20 mmol/L  10/13/19  09:24    


 


BUN  35 mg/dL (9-20)  H  10/13/19  09:24    


 


Creatinine  1.1 mg/dL (0.8-1.5)   10/13/19  09:24    


 


Estimated GFR  > 60 ml/min  10/13/19  09:24    


 


BUN/Creatinine Ratio  32 %  10/13/19  09:24    


 


Glucose  98 mg/dL ()   10/13/19  09:24    


 


POC Glucose  106  ()  H  10/13/19  16:39    


 


Hemoglobin A1c  5.5 % (4-6)   10/13/19  09:24    


 


Calcium  9.5 mg/dL (8.4-10.2)   10/13/19  09:24    


 


Total Bilirubin  0.40 mg/dL (0.1-1.2)   10/13/19  09:24    


 


AST  29 units/L (5-40)   10/13/19  09:24    


 


ALT  20 units/L (7-56)   10/13/19  09:24    


 


Alkaline Phosphatase  83 units/L ()   10/13/19  09:24    


 


Total Protein  6.5 g/dL (6.3-8.2)   10/13/19  09:24    


 


Albumin  4.2 g/dL (3.9-5)   10/13/19  09:24    


 


Albumin/Globulin Ratio  1.8 %  10/13/19  09:24    


 


Triglycerides  129 mg/dL (2-149)   10/13/19  09:24    


 


Cholesterol  133 mg/dL ()   10/13/19  09:24    


 


LDL Cholesterol Direct  74 mg/dL ()   10/13/19  09:24    


 


HDL Cholesterol  43 mg/dL (40-59)   10/13/19  09:24    


 


Cholesterol/HDL Ratio  3.09 %  10/13/19  09:24    


 


TSH  3.070 mlU/mL (0.270-4.200)   10/13/19  09:24    


 


Valproic Acid  67.4 ug/mL ()   10/17/19  08:03    


 


RPR  Nonreactive  (Nonreactive)   10/13/19  09:24    








                                Last Vital Signs











Temp  97.3 F L  10/19/19 18:54


 


Pulse  69   10/19/19 18:54


 


Resp  18   10/19/19 18:54


 


BP  111/54   10/19/19 18:54


 


Pulse Ox  97   10/19/19 18:54

## 2019-10-21 RX ADMIN — GUAIFENESIN SCH MG: 600 TABLET ORAL at 11:00

## 2019-10-21 RX ADMIN — VENLAFAXINE HYDROCHLORIDE SCH MG: 75 CAPSULE, EXTENDED RELEASE ORAL at 10:52

## 2019-10-21 RX ADMIN — NAPROXEN SCH MG: 375 TABLET ORAL at 10:53

## 2019-10-21 RX ADMIN — MODAFINIL SCH MG: 100 TABLET ORAL at 11:00

## 2019-10-21 RX ADMIN — GUAIFENESIN SCH MG: 600 TABLET ORAL at 22:17

## 2019-10-21 RX ADMIN — Medication SCH MG: at 22:17

## 2019-10-21 RX ADMIN — DILTIAZEM HYDROCHLORIDE SCH MG: 120 CAPSULE, COATED, EXTENDED RELEASE ORAL at 10:51

## 2019-10-21 RX ADMIN — MIRTAZAPINE SCH MG: 30 TABLET, FILM COATED ORAL at 22:17

## 2019-10-21 RX ADMIN — METOPROLOL SUCCINATE SCH MG: 50 TABLET, EXTENDED RELEASE ORAL at 10:49

## 2019-10-21 RX ADMIN — NAPROXEN SCH MG: 375 TABLET ORAL at 22:18

## 2019-10-21 RX ADMIN — ASPIRIN SCH MG: 81 TABLET, COATED ORAL at 11:00

## 2019-10-21 NOTE — XRAY REPORT
CHEST 1 VIEW 



INDICATION: 

Pre Nursing home placement screening for TB.



COMPARISON: 

10/18/2019



FINDINGS:

Support devices: None.



Heart: Within normal limits.

Pulmonary vasculature: Normal.

Lungs/Pleura: Hyperinflated lungs. No airspace disease or pleural effusion. 



Additional findings: Surgical clips in the left axilla and chest wall.



IMPRESSION:

1. No acute findings. 

2. No signs of acute or chronic TB.



Signer Name: Malick Amor MD 

Signed: 10/21/2019 10:48 AM

 Workstation Name: [id number]

## 2019-10-21 NOTE — PROGRESS NOTE
Subjective


Date of service: 10/21/19


Principal diagnosis: Dementia, Schizoaffective disorder


Subjective Comment: 


Patient is improving. Nursing staff reports that patient is alert and oriented 

to person. patient is restless at times and requires redirection. patient 

ambulates with a limp and unsteady gait. patient was cooperative with morning 

ADL. patient thoughts are disorganized and patient is disoriented. no aggression

this shift. currently medication compliant, tolerating crushed. 





Pt is currently sleeping and did not attend meeting with physician. -21 Oct 2019





Nursing note states, patient is alert and oriented to self, with an unsteady 

gait. Pt slept for 9 hours during the night and is compliant with medications 

when crushed. Incontinent of bowel and bladder. Plan to D/C pt 22 Oct 2019.





MSE


Orientation: person


Affect: normal


Mood: congruent with affect


Thought Process: Disorganized


Perceptions: none


Speech: paucity


Concentration: unable to pay attention


Motor activity: agitated


Level of consciousness: alert


Memory: Recent Impaired, Remote Impaired


Interaction: cooperative





Objective





- Objective Observation


Participation Level: None


Reason(s) For Not Participating: Sleeping





Assessment and Plan





- Patient Problems


(1) Major neurocognitive disorder due to Alzheimer's disease, with behavioral 

disturbance


Current Visit: Yes   Status: Acute   





(2) Schizoaffective disorder, bipolar type


Current Visit: Yes   Status: Acute   





Medications and Allergies


                                    Allergies











Allergy/AdvReac Type Severity Reaction Status Date / Time


 


aripiprazole [From Abilify] Allergy  Unknown Verified 10/12/19 23:01


 


diazepam [From Valium] Allergy  Unknown Verified 10/12/19 23:01


 


lithium Allergy  Unknown Verified 10/12/19 23:01


 


lorazepam [From Ativan] Allergy  Unknown Verified 10/12/19 23:01











                                Home Medications











 Medication  Instructions  Recorded  Confirmed  Last Taken  Type


 


AtorvaSTATin [Lipitor] 40 mg PO QHS 10/13/19 10/14/19 Unknown History


 


Divalproex Sprinkle [Depakote 500 mg PO Q8H 10/13/19 10/14/19 Unknown History





Sprinkle]     


 


Metoprolol Xl [Metoprolol 50 mg PO DAILY 10/13/19 10/14/19 Unknown History





SUCCINATE ER TAB]     


 


Mirtazapine [Remeron 30mg TAB] 30 mg PO QHS 10/13/19 10/14/19 Unknown History


 


Modafinil [Provigil] 100 mg PO DAILY 10/13/19 10/14/19 Unknown History


 


Naproxen [Naproxen DR] 375 mg PO BID 10/13/19 10/14/19 Unknown History


 


OLANzapine [ZyPREXA] 5 mg PO TID 10/13/19 10/14/19 Unknown History


 


Venlafaxine  mg PO DAILY 10/13/19 10/14/19 Unknown History


 


dilTIAZem CD [Cardizem Cd] 120 mg PO DAILY 10/13/19 10/13/19 Unknown History


 


OLANzapine ZYDIS [ZyPREXA Zydis] 10 mg PO TID PRN 10/14/19 10/14/19 Unknown 

History











Active Meds: 


Active Medications





Aspirin (Halfprin Ec)  81 mg PO QDAY [facility]


   Last Admin: 10/20/19 09:41 Dose:  81 mg


   Documented by: 


Atorvastatin Calcium (Lipitor)  40 mg PO QHS [facility]


   Last Admin: 10/20/19 21:02 Dose:  40 mg


   Documented by: 


Diltiazem HCl (Cardizem Cd)  120 mg PO QDAY [facility]


   Last Admin: 10/20/19 09:40 Dose:  120 mg


   Documented by: 


Diphenhydramine HCl (Benadryl)  50 mg IM Q6H PRN


   PRN Reason: Extrapyramidal Effects


   Last Admin: 10/16/19 10:30 Dose:  50 mg


   Documented by: 


Diphenhydramine HCl (Benadryl)  50 mg PO Q6H PRN


   PRN Reason: Extrapyramidal Effects


Divalproex Sodium (Depakote Sprinkle)  500 mg PO Q8HR [facility]


   Last Admin: 10/21/19 06:13 Dose:  500 mg


   Documented by: 


Guaifenesin (Mucinex Er)  600 mg PO BID [facility]


   Last Admin: 10/20/19 21:02 Dose:  600 mg


   Documented by: 


Haloperidol (Haldol)  5 mg PO Q6H PRN


   PRN Reason: Agitation


   Last Admin: 10/17/19 15:18 Dose:  5 mg


   Documented by: 


Haloperidol Lactate (Haldol)  5 mg IM Q6H PRN


   PRN Reason: Agitation


   Last Admin: 10/16/19 16:01 Dose:  5 mg


   Documented by: 


Melatonin (Melatonin)  3 mg PO QHS [facility]


   Last Admin: 10/20/19 21:02 Dose:  3 mg


   Documented by: 


Metoprolol Succinate (Toprol Xl)  50 mg PO QDAY [facility]


   Last Admin: 10/20/19 09:41 Dose:  50 mg


   Documented by: 


Mirtazapine (Remeron)  30 mg PO QHS [facility]


   Last Admin: 10/20/19 21:03 Dose:  30 mg


   Documented by: 


Modafinil (Provigil)  100 mg PO QAM [facility]


   Last Admin: 10/20/19 09:41 Dose:  100 mg


   Documented by: 


Naproxen (Naprosyn)  375 mg PO BID [facility]


   Last Admin: 10/20/19 21:01 Dose:  375 mg


   Documented by: 


Olanzapine (Zyprexa Zydis)  10 mg PO TID PRN


   PRN Reason: Agitation


   Last Admin: 10/17/19 15:33 Dose:  10 mg


   Documented by: 


Olanzapine (Zyprexa Zydis)  5 mg PO TID [facility]


   Last Admin: 10/20/19 20:01 Dose:  5 mg


   Documented by: 


Venlafaxine HCl (Effexor Xr)  150 mg PO QDAY [facility]


   Last Admin: 10/20/19 09:40 Dose:  150 mg


   Documented by: 











Results





- Results


Labs/Vitals: 


                             Laboratory Last Values











WBC  6.2 K/mm3 (4.5-11.0)   10/13/19  09:24    


 


RBC  4.94 M/mm3 (3.65-5.03)   10/13/19  09:24    


 


Hgb  14.8 gm/dl (11.8-15.2)   10/13/19  09:24    


 


Hct  44.2 % (35.5-45.6)   10/13/19  09:24    


 


MCV  90 fl (84-94)   10/13/19  09:24    


 


MCH  30 pg (28-32)   10/13/19  09:24    


 


MCHC  33 % (32-34)   10/13/19  09:24    


 


RDW  15.8 % (13.2-15.2)  H  10/13/19  09:24    


 


Plt Count  158 K/mm3 (140-440)   10/13/19  09:24    


 


Lymph % (Auto)  23.4 % (13.4-35.0)   10/13/19  09:24    


 


Mono % (Auto)  10.1 % (0.0-7.3)  H  10/13/19  09:24    


 


Eos % (Auto)  0.1 % (0.0-4.3)   10/13/19  09:24    


 


Baso % (Auto)  0.7 % (0.0-1.8)   10/13/19  09:24    


 


Lymph #  1.5 K/mm3 (1.2-5.4)   10/13/19  09:24    


 


Mono #  0.6 K/mm3 (0.0-0.8)   10/13/19  09:24    


 


Eos #  0.0 K/mm3 (0.0-0.4)   10/13/19  09:24    


 


Baso #  0.0 K/mm3 (0.0-0.1)   10/13/19  09:24    


 


Seg Neutrophils %  65.7 % (40.0-70.0)   10/13/19  09:24    


 


Seg Neutrophils #  4.1 K/mm3 (1.8-7.7)   10/13/19  09:24    


 


Sodium  145 mmol/L (137-145)   10/13/19  09:24    


 


Potassium  4.3 mmol/L (3.6-5.0)   10/13/19  09:24    


 


Chloride  104.1 mmol/L ()   10/13/19  09:24    


 


Carbon Dioxide  25 mmol/L (22-30)   10/13/19  09:24    


 


Anion Gap  20 mmol/L  10/13/19  09:24    


 


BUN  35 mg/dL (9-20)  H  10/13/19  09:24    


 


Creatinine  1.1 mg/dL (0.8-1.5)   10/13/19  09:24    


 


Estimated GFR  > 60 ml/min  10/13/19  09:24    


 


BUN/Creatinine Ratio  32 %  10/13/19  09:24    


 


Glucose  98 mg/dL ()   10/13/19  09:24    


 


POC Glucose  106  ()  H  10/13/19  16:39    


 


Hemoglobin A1c  5.5 % (4-6)   10/13/19  09:24    


 


Calcium  9.5 mg/dL (8.4-10.2)   10/13/19  09:24    


 


Total Bilirubin  0.40 mg/dL (0.1-1.2)   10/13/19  09:24    


 


AST  29 units/L (5-40)   10/13/19  09:24    


 


ALT  20 units/L (7-56)   10/13/19  09:24    


 


Alkaline Phosphatase  83 units/L ()   10/13/19  09:24    


 


Total Protein  6.5 g/dL (6.3-8.2)   10/13/19  09:24    


 


Albumin  4.2 g/dL (3.9-5)   10/13/19  09:24    


 


Albumin/Globulin Ratio  1.8 %  10/13/19  09:24    


 


Triglycerides  129 mg/dL (2-149)   10/13/19  09:24    


 


Cholesterol  133 mg/dL ()   10/13/19  09:24    


 


LDL Cholesterol Direct  74 mg/dL ()   10/13/19  09:24    


 


HDL Cholesterol  43 mg/dL (40-59)   10/13/19  09:24    


 


Cholesterol/HDL Ratio  3.09 %  10/13/19  09:24    


 


TSH  3.070 mlU/mL (0.270-4.200)   10/13/19  09:24    


 


Valproic Acid  67.4 ug/mL ()   10/17/19  08:03    


 


RPR  Nonreactive  (Nonreactive)   10/13/19  09:24    








                                Last Vital Signs











Temp  97.5 F L  10/20/19 22:00


 


Pulse  75   10/20/19 22:00


 


Resp  18   10/20/19 22:00


 


BP  136/78   10/20/19 22:00


 


Pulse Ox  97   10/20/19 22:00

## 2019-10-22 VITALS — SYSTOLIC BLOOD PRESSURE: 161 MMHG | DIASTOLIC BLOOD PRESSURE: 92 MMHG

## 2019-10-22 RX ADMIN — DILTIAZEM HYDROCHLORIDE SCH MG: 120 CAPSULE, COATED, EXTENDED RELEASE ORAL at 11:11

## 2019-10-22 RX ADMIN — NAPROXEN SCH MG: 375 TABLET ORAL at 11:04

## 2019-10-22 RX ADMIN — MODAFINIL SCH MG: 100 TABLET ORAL at 11:03

## 2019-10-22 RX ADMIN — METOPROLOL SUCCINATE SCH MG: 50 TABLET, EXTENDED RELEASE ORAL at 11:12

## 2019-10-22 RX ADMIN — VENLAFAXINE HYDROCHLORIDE SCH MG: 75 CAPSULE, EXTENDED RELEASE ORAL at 11:07

## 2019-10-22 RX ADMIN — GUAIFENESIN SCH MG: 600 TABLET ORAL at 11:06

## 2019-10-22 RX ADMIN — ASPIRIN SCH MG: 81 TABLET, COATED ORAL at 11:18

## 2019-10-22 NOTE — DISCHARGE SUMMARY
Providers





- Providers


Date of Admission: 


10/12/19 23:04





Date of discharge: 10/22/19


Attending physician: 


NIKI SAVAGE MD





                                        





10/12/19 23:31


Consult to Dietitian/Nutrition [CONS] Routine 


   Physician Instructions: 


   Reason For Exam: 


   Reason for Consult: HTN, Kidney Dz


Consult to Physician [CONS] Routine 


   Comment: 


   Consulting Provider: HUSAM ALMONTE


   Physician Instructions: 


   Reason For Exam: H & P for New Admission











Primary care physician: 


NIKI SAVAGE MD








Hospitalization


Reason for admission: Danger to others, Unable to care for self


Condition: Stable


Hospital course: 


The patient was provided inpatient psychiatric treatment with safe and 

supportive environment, group therapy, individual counseling, psychiatric 

medication, medication adjustment, adverse effect monitor, medical evaluation, 

medical treatment, social service assessment, family/social support meeting, 

placement assessment and psycho-education. The patients mood, anxiety, 

thoughts, stress management skill, cognition, impulse/anger control, motivation,

 understanding of disease, compliance to treatment and appreciation on 

family/social support are improved and stabilized. At the time of discharge, the

 patient had no suicidal ideas, no homicidal ideas, no aggressive thoughts, no 

endangering behavior and no debilitating adverse effects.


Disposition: DC/[place]-03 SN[facility]


Allergies/Adverse Reactions: 


                                    Allergies





aripiprazole [From Abilify] Allergy (Verified 10/12/19 23:01)


   Unknown


diazepam [From Valium] Allergy (Verified 10/12/19 23:01)


   Unknown


lithium Allergy (Verified 10/12/19 23:01)


   Unknown


lorazepam [From Ativan] Allergy (Verified 10/12/19 23:01)


   Unknown








Vital Signs: 


                                Last Vital Signs











Temp  98.0 F   10/21/19 21:38


 


Pulse  68   10/21/19 21:38


 


Resp  18   10/21/19 22:18


 


BP  141/79   10/21/19 21:38


 


Pulse Ox  100   10/21/19 21:38











Last Lab: 


                             Laboratory Last Values











WBC  6.2 K/mm3 (4.5-11.0)   10/13/19  09:24    


 


RBC  4.94 M/mm3 (3.65-5.03)   10/13/19  09:24    


 


Hgb  14.8 gm/dl (11.8-15.2)   10/13/19  09:24    


 


Hct  44.2 % (35.5-45.6)   10/13/19  09:24    


 


MCV  90 fl (84-94)   10/13/19  09:24    


 


MCH  30 pg (28-32)   10/13/19  09:24    


 


MCHC  33 % (32-34)   10/13/19  09:24    


 


RDW  15.8 % (13.2-15.2)  H  10/13/19  09:24    


 


Plt Count  158 K/mm3 (140-440)   10/13/19  09:24    


 


Lymph % (Auto)  23.4 % (13.4-35.0)   10/13/19  09:24    


 


Mono % (Auto)  10.1 % (0.0-7.3)  H  10/13/19  09:24    


 


Eos % (Auto)  0.1 % (0.0-4.3)   10/13/19  09:24    


 


Baso % (Auto)  0.7 % (0.0-1.8)   10/13/19  09:24    


 


Lymph #  1.5 K/mm3 (1.2-5.4)   10/13/19  09:24    


 


Mono #  0.6 K/mm3 (0.0-0.8)   10/13/19  09:24    


 


Eos #  0.0 K/mm3 (0.0-0.4)   10/13/19  09:24    


 


Baso #  0.0 K/mm3 (0.0-0.1)   10/13/19  09:24    


 


Seg Neutrophils %  65.7 % (40.0-70.0)   10/13/19  09:24    


 


Seg Neutrophils #  4.1 K/mm3 (1.8-7.7)   10/13/19  09:24    


 


Sodium  145 mmol/L (137-145)   10/13/19  09:24    


 


Potassium  4.3 mmol/L (3.6-5.0)   10/13/19  09:24    


 


Chloride  104.1 mmol/L ()   10/13/19  09:24    


 


Carbon Dioxide  25 mmol/L (22-30)   10/13/19  09:24    


 


Anion Gap  20 mmol/L  10/13/19  09:24    


 


BUN  35 mg/dL (9-20)  H  10/13/19  09:24    


 


Creatinine  1.1 mg/dL (0.8-1.5)   10/13/19  09:24    


 


Estimated GFR  > 60 ml/min  10/13/19  09:24    


 


BUN/Creatinine Ratio  32 %  10/13/19  09:24    


 


Glucose  98 mg/dL ()   10/13/19  09:24    


 


POC Glucose  106  ()  H  10/13/19  16:39    


 


Hemoglobin A1c  5.5 % (4-6)   10/13/19  09:24    


 


Calcium  9.5 mg/dL (8.4-10.2)   10/13/19  09:24    


 


Total Bilirubin  0.40 mg/dL (0.1-1.2)   10/13/19  09:24    


 


AST  29 units/L (5-40)   10/13/19  09:24    


 


ALT  20 units/L (7-56)   10/13/19  09:24    


 


Alkaline Phosphatase  83 units/L ()   10/13/19  09:24    


 


Total Protein  6.5 g/dL (6.3-8.2)   10/13/19  09:24    


 


Albumin  4.2 g/dL (3.9-5)   10/13/19  09:24    


 


Albumin/Globulin Ratio  1.8 %  10/13/19  09:24    


 


Triglycerides  129 mg/dL (2-149)   10/13/19  09:24    


 


Cholesterol  133 mg/dL ()   10/13/19  09:24    


 


LDL Cholesterol Direct  74 mg/dL ()   10/13/19  09:24    


 


HDL Cholesterol  43 mg/dL (40-59)   10/13/19  09:24    


 


Cholesterol/HDL Ratio  3.09 %  10/13/19  09:24    


 


TSH  3.070 mlU/mL (0.270-4.200)   10/13/19  09:24    


 


Valproic Acid  67.4 ug/mL ()   10/17/19  08:03    


 


RPR  Nonreactive  (Nonreactive)   10/13/19  09:24    














- Discharge Diagnoses


(1) Major neurocognitive disorder due to Alzheimer's disease, with behavioral 

disturbance


Status: Acute   





(2) Schizoaffective disorder, bipolar type


Status: Acute   





Core Measure Documentation





- Palliative Care


Palliative Care/ Comfort Measures: Not Applicable





- Core Measures


Any of the following diagnoses?: none





Exam





- Constitutional


Vitals: 


                                        











Temp Pulse Resp BP Pulse Ox


 


 98.0 F   68   18   141/79   100 


 


 10/21/19 21:38  10/21/19 21:38  10/21/19 22:18  10/21/19 21:38  10/21/19 21:38











General appearance: Present: no acute distress





- EENT


Eyes: Present: PERRL, EOM intact


ENT: hearing intact, clear oral mucosa





- Neck


Neck: Present: supple, normal ROM





- Respiratory


Respiratory effort: normal





Plan


Activity: advance as tolerated, fall precautions


Weight Bearing Status: Weight Bear as Tolerated


Care Plan Goals: 


Maintain a stable mood


Plan of Treatment: 


Take medications as prescribed


Health Concerns: 


Dementia


Assessment: 


Schizoaffective disorder


Dementia


Follow up with: 


NIKI SAVAGE MD [Primary Care Provider] - 7 Days


Prescriptions: 


AtorvaSTATin [Lipitor] 40 mg PO QHS #30


Melatonin [Melatonin 5MG TAB] 3 mg PO QHS #30 tablet


Mirtazapine [Remeron 30mg TAB] 30 mg PO QHS #30


dilTIAZem CD [Cardizem CD] 120 mg PO DAILY #30 cap


Divalproex Sprinkle [Depakote Sprinkle] 500 mg PO Q8H #90 cap


Aspirin EC [Halfprin EC] 81 mg PO QDAY #30 tablet


Metoprolol Xl [Metoprolol SUCCINATE ER TAB] 50 mg PO DAILY #60


guaiFENesin ER [Mucinex ER] 600 mg PO BID #30 tablet


Naproxen [Naproxen DR] 375 mg PO BID #30


Modafinil [Provigil] 100 mg PO DAILY #30


Venlafaxine  mg PO DAILY #30


OLANzapine ZYDIS [ZyPREXA Zydis] 10 mg PO TID PRN #60 tab.rapdis


 PRN Reason: Agitation


OLANzapine ZYDIS [ZyPREXA Zydis] 5 mg PO TID #90 tab.rapdis